# Patient Record
Sex: FEMALE | Race: BLACK OR AFRICAN AMERICAN | NOT HISPANIC OR LATINO | Employment: OTHER | URBAN - METROPOLITAN AREA
[De-identification: names, ages, dates, MRNs, and addresses within clinical notes are randomized per-mention and may not be internally consistent; named-entity substitution may affect disease eponyms.]

---

## 2017-03-16 ENCOUNTER — HOSPITAL ENCOUNTER (OUTPATIENT)
Dept: RADIOLOGY | Facility: OTHER | Age: 72
Discharge: HOME | End: 2017-03-16
Attending: ANESTHESIOLOGY
Payer: MEDICARE

## 2017-03-16 ENCOUNTER — OFFICE VISIT (OUTPATIENT)
Dept: PAIN MEDICINE | Facility: CLINIC | Age: 72
End: 2017-03-16
Attending: ANESTHESIOLOGY
Payer: MEDICARE

## 2017-03-16 VITALS
DIASTOLIC BLOOD PRESSURE: 68 MMHG | TEMPERATURE: 98 F | SYSTOLIC BLOOD PRESSURE: 127 MMHG | BODY MASS INDEX: 29.95 KG/M2 | WEIGHT: 169 LBS | HEART RATE: 68 BPM | HEIGHT: 63 IN

## 2017-03-16 DIAGNOSIS — M25.552 BILATERAL HIP PAIN: ICD-10-CM

## 2017-03-16 DIAGNOSIS — M47.816 FACET ARTHRITIS OF LUMBAR REGION: Primary | ICD-10-CM

## 2017-03-16 DIAGNOSIS — M25.551 BILATERAL HIP PAIN: ICD-10-CM

## 2017-03-16 DIAGNOSIS — M47.816 FACET ARTHRITIS OF LUMBAR REGION: ICD-10-CM

## 2017-03-16 PROCEDURE — 1157F ADVNC CARE PLAN IN RCRD: CPT | Mod: S$GLB,,, | Performed by: ANESTHESIOLOGY

## 2017-03-16 PROCEDURE — 73521 X-RAY EXAM HIPS BI 2 VIEWS: CPT | Mod: TC

## 2017-03-16 PROCEDURE — 1159F MED LIST DOCD IN RCRD: CPT | Mod: S$GLB,,, | Performed by: ANESTHESIOLOGY

## 2017-03-16 PROCEDURE — 99204 OFFICE O/P NEW MOD 45 MIN: CPT | Mod: GC,S$GLB,, | Performed by: ANESTHESIOLOGY

## 2017-03-16 PROCEDURE — 73521 X-RAY EXAM HIPS BI 2 VIEWS: CPT | Mod: 26,,, | Performed by: RADIOLOGY

## 2017-03-16 PROCEDURE — 1160F RVW MEDS BY RX/DR IN RCRD: CPT | Mod: S$GLB,,, | Performed by: ANESTHESIOLOGY

## 2017-03-16 PROCEDURE — 72110 X-RAY EXAM L-2 SPINE 4/>VWS: CPT | Mod: 26,,, | Performed by: RADIOLOGY

## 2017-03-16 PROCEDURE — 99999 PR PBB SHADOW E&M-NEW PATIENT-LVL III: CPT | Mod: PBBFAC,,, | Performed by: ANESTHESIOLOGY

## 2017-03-16 PROCEDURE — 72110 X-RAY EXAM L-2 SPINE 4/>VWS: CPT | Mod: TC

## 2017-03-16 PROCEDURE — 1125F AMNT PAIN NOTED PAIN PRSNT: CPT | Mod: S$GLB,,, | Performed by: ANESTHESIOLOGY

## 2017-03-16 NOTE — PROGRESS NOTES
Subjective:     Patient ID: Diane Reid is a 71 y.o. female.    Chief Complaint: Pain    Consulted by: self, friend of Dinah Walker    Disclaimer: This note was generated using voice recognition software.  There may be a typographical errors that were missed during proofreading.      HPI:    Diane Reid is a 71 y.o. female who presents today with back pain. She has a 20+ year hx of back pain.   She has not seen any physician for her pain and has been treating it successfully with OTC ibuprofen and visits to a chiropractor. This pain is described in detail below.    Physical Therapy: no    Non-pharmacologic Treatment: chiropractor          · TENS? no    Pain Medications:         · Currently taking: NSAIDS    · Has tried in the past:  biofreese     · Has not tried:  Opioids, Tylenol, Muscle relaxants, TCAs, SNRIs, anticonvulsants, prescription topical creams    Blood thinners: no    Interventional Therapies: none    Relevant Surgeries: none    Affecting sleep? no    Affecting daily activities? yes    Depressive symptoms? no          · SI/HI? No    Work status: retired     Low-back Pain   This is a chronic problem. The current episode started more than 1 year ago. The problem occurs constantly. The problem has been gradually worsening since onset. The pain is present in the lumbar spine. The pain does not radiate. The quality of the pain is described as aching (throbbing). The pain is at a severity of 5/10. The pain is mild. The pain is the same all the time. The symptoms are aggravated by sitting and lying down. Stiffness is present all day. Pertinent negatives include no abdominal pain, bladder incontinence, bowel incontinence, chest pain, fever, numbness or paresthesias. She has tried chiropractic manipulation, heat and home exercises for the symptoms. The treatment provided no relief. Physical therapy was not tried.      No flowsheet data found.]    Review of Systems   Constitution: Negative for chills,  fever and night sweats.   Eyes: Negative for photophobia.   Cardiovascular: Negative for chest pain, dyspnea on exertion, irregular heartbeat and palpitations.   Respiratory: Negative for cough and shortness of breath.    Endocrine: Negative for cold intolerance and heat intolerance.   Hematologic/Lymphatic: Negative for bleeding problem. Does not bruise/bleed easily.   Musculoskeletal: Positive for back pain. Negative for neck pain.   Gastrointestinal: Negative for abdominal pain, change in bowel habit, bowel incontinence, constipation and diarrhea.   Genitourinary: Negative for bladder incontinence, frequency and incomplete emptying.   Neurological: Negative for dizziness, numbness and paresthesias.   Psychiatric/Behavioral: Negative for depression, suicidal ideas and thoughts of violence.   All other systems reviewed and are negative.            Past Medical History:   Diagnosis Date    Arthritis     Asthma     Pinched nerve in neck     bilateral    Pulmonary stenosis        Past Surgical History:   Procedure Laterality Date    CERVICAL SPINE SURGERY      HYSTERECTOMY         Review of patient's allergies indicates:   Allergen Reactions    Penicillins      Pt cant remember allergy type         Current Outpatient Prescriptions   Medication Sig Dispense Refill    estrogens, conjugated, (PREMARIN) 1.25 MG tablet Take 1.25 mg by mouth once daily.      oxycodone-acetaminophen 5-325 mg (PERCOCET) 5-325 mg per tablet Take 1 tablet by mouth every 4 (four) hours as needed for Pain. 45 tablet 0     No current facility-administered medications for this visit.        History reviewed. No pertinent family history.    Social History     Social History    Marital status: Single     Spouse name: N/A    Number of children: N/A    Years of education: N/A     Occupational History    Not on file.     Social History Main Topics    Smoking status: Former Smoker     Years: 20.00     Quit date: 1/1/2006    Smokeless  "tobacco: Not on file    Alcohol use Yes      Comment: occasional    Drug use: No    Sexual activity: Not on file     Other Topics Concern    Not on file     Social History Narrative       Objective:     Vitals:    03/16/17 0934   Temp: 98 °F (36.7 °C)   TempSrc: Oral   Weight: 76.7 kg (169 lb)   Height: 5' 3" (1.6 m)   PainSc:   4   PainLoc: Back       GEN:  Well developed, well nourished.  No acute distress.   HEENT:  No trauma.  Mucous membranes moist.  Nares patent bilaterally.  PSYCH: Normal affect. Thought content appropriate.  CHEST:  Breathing symmetric.  No audible wheezing.  ABD: Soft, non-tender, non-distended.  SKIN:  Warm, pink, dry.  No rash on exposed areas.    EXT:  No cyanosis, clubbing, or edema.  No color change or changes in nail or hair growth.  NEURO/MUSCULOSKELETAL:  Fully alert, oriented, and appropriate. Speech normal willy. No cranial nerve deficits.   Gait: antalgic.  - trendelenburg sign bilaterally.   Motor Strength: 5/5 motor strength throughout lower extremities.   Sensory: - sensory deficit in the lower extremities.   Reflexes:  2+ and symmetric throughout.  Downgoing Babinski's bilaterally.  No clonus or spasticity.  L-Spine:  normal ROM with pain on extension. + facet loading bilaterally.  - SLR bilaterally.  - femoral stretch bilaterally.  SI Joint/Hip: + KEYSHA bilaterally.  + FADIR bilaterally.  no TTP over lumbar paraspinals, bilateral SI joints, hips, piriformis muscles, or GTB.          Imaging:        The imaging studies listed below were independently reviewed by me, and I agree with the findings as documented below.   X RAY Lumbar spine 3/16/17   Narrative   Procedure: Lumbar spine series.  This is interpreted without the benefit of similar prior studies for comparison.    Findings: AP, lateral, obliqueand coned down lateral radiographs of the lumbar spine reveal 5 non-rib bearing lumbar vertebral bodies with normal vertebral body heights and pedicles.  Loss of disc " height at L3-L4 and L5-S1.  There is no malalignment, spondylolysis or spondylolisthesis.    Facet arthropathy is identified throughout the spine.  This is most pronounced at L3-L4 and L5-S1.  The surrounding soft tissues are normal.   Impression    Multilevel degenerative disc and joint disease of the lumbar spine most advanced at L3-L4 and L5-S1.        B hip xray 3/16/17  Comparison: None.    Findings: AP and frog lateral radiographs of the hips and pelvis demonstrate moderate axial joint space loss on the left and mild axial joint space loss on the right.  There is normal mineralization.  No fracture, dislocation or osseous destructive process.  Subchondral cysts are identified in the femoral head on the right and in the left superior acetabulum.  The surrounding soft tissues and remaining joint spaces are normal.   Impression    Mild to moderate osteoporosis of both hips with no acute findings.           Assessment:     Encounter Diagnoses   Name Primary?    Facet arthritis of lumbar region Yes    Bilateral hip pain        Plan:     Diane DUNN was seen today for low-back pain.    Diagnoses and all orders for this visit:    Facet arthritis of lumbar region  -     X-Ray Lumbar Spine Complete 5 View; Future    Bilateral hip pain  -     X-Ray Hips Bilateral 2 View Incl AP Pelvis; Future       Ms Tureaud pain is consistent with the above.    We discussed the assessment and recommendations.  All available images were reviewed . We discussed the disease process, prognosis, treatment plan, and risks and benefits. The patient is aware of the risks and benefits of the medications being prescribed, common side effects, and proper usage. The following is the plan we agreed on:     1. X ray of lumbar spine (5 view) and bilateral hips   2. B lumbar facet joint injections L4-5 and L5-S1  1. If good relief, repeat PRN  2. If good, but not durable relief, MBB workup   3. If no relief, MRI   3. Continue ibuprofen PRN and home  exercise program   4. RTC in 3-6 weeks or sooner if needed.    Donal Theodore MD   Anesthesiology PGY IV  [phone]    Thank you for allowing me to participate in the care of this patient.   Please do not hesitate to call me at [phone] with any questions or concerns.    Greater than 25 minutes spent in total in todays visit with the patient, with more than half that time direct face to face counseling and education with the patient today. We discussed the disease process, prognosis, treatment plan, and risks and benefits.    I have seen the patient with the resident physician.  I have performed my own history and physical exam and we have come up with the above plan.  The patient is in agreement with our plan.       The above plan and management options were discussed at length with patient. Patient is in agreement with the above and verbalized understanding. It will be communicated with the referring physician via electronic record, fax, or mail.

## 2017-03-28 ENCOUNTER — HOSPITAL ENCOUNTER (OUTPATIENT)
Facility: OTHER | Age: 72
Discharge: HOME | End: 2017-03-28
Attending: ANESTHESIOLOGY | Admitting: ANESTHESIOLOGY
Payer: MEDICARE

## 2017-03-28 ENCOUNTER — SURGERY (OUTPATIENT)
Age: 72
End: 2017-03-28

## 2017-03-28 VITALS
OXYGEN SATURATION: 99 % | RESPIRATION RATE: 18 BRPM | SYSTOLIC BLOOD PRESSURE: 151 MMHG | HEIGHT: 63 IN | DIASTOLIC BLOOD PRESSURE: 90 MMHG | TEMPERATURE: 99 F | BODY MASS INDEX: 29.59 KG/M2 | HEART RATE: 60 BPM | WEIGHT: 167 LBS

## 2017-03-28 DIAGNOSIS — M47.816 FACET ARTHRITIS OF LUMBAR REGION: Primary | ICD-10-CM

## 2017-03-28 PROCEDURE — 64493 INJ PARAVERT F JNT L/S 1 LEV: CPT | Mod: 50 | Performed by: ANESTHESIOLOGY

## 2017-03-28 PROCEDURE — 25000003 PHARM REV CODE 250: Performed by: ANESTHESIOLOGY

## 2017-03-28 PROCEDURE — 25500020 PHARM REV CODE 255: Performed by: ANESTHESIOLOGY

## 2017-03-28 PROCEDURE — 64494 INJ PARAVERT F JNT L/S 2 LEV: CPT | Mod: 50 | Performed by: ANESTHESIOLOGY

## 2017-03-28 PROCEDURE — 63600175 PHARM REV CODE 636 W HCPCS: Performed by: ANESTHESIOLOGY

## 2017-03-28 PROCEDURE — 64493 INJ PARAVERT F JNT L/S 1 LEV: CPT | Mod: 50,,, | Performed by: ANESTHESIOLOGY

## 2017-03-28 PROCEDURE — 64494 INJ PARAVERT F JNT L/S 2 LEV: CPT | Mod: 50,,, | Performed by: ANESTHESIOLOGY

## 2017-03-28 RX ORDER — METHYLPREDNISOLONE ACETATE 80 MG/ML
80 INJECTION, SUSPENSION INTRA-ARTICULAR; INTRALESIONAL; INTRAMUSCULAR; SOFT TISSUE ONCE
Status: COMPLETED | OUTPATIENT
Start: 2017-03-29 | End: 2017-03-28

## 2017-03-28 RX ORDER — LIDOCAINE HYDROCHLORIDE 10 MG/ML
10 INJECTION INFILTRATION; PERINEURAL ONCE
Status: COMPLETED | OUTPATIENT
Start: 2017-03-29 | End: 2017-03-28

## 2017-03-28 RX ORDER — BUPIVACAINE HYDROCHLORIDE 5 MG/ML
3 INJECTION, SOLUTION EPIDURAL; INTRACAUDAL ONCE
Status: COMPLETED | OUTPATIENT
Start: 2017-03-29 | End: 2017-03-28

## 2017-03-28 RX ADMIN — IOHEXOL 5 ML: 300 INJECTION, SOLUTION INTRAVENOUS at 10:03

## 2017-03-28 RX ADMIN — SODIUM BICARBONATE 4 MEQ: 0.2 INJECTION, SOLUTION INTRAVENOUS at 10:03

## 2017-03-28 RX ADMIN — LIDOCAINE HYDROCHLORIDE 10 ML: 10 INJECTION, SOLUTION INFILTRATION; PERINEURAL at 10:03

## 2017-03-28 RX ADMIN — BUPIVACAINE HYDROCHLORIDE 10 ML: 5 INJECTION, SOLUTION EPIDURAL; INTRACAUDAL; PERINEURAL at 10:03

## 2017-03-28 RX ADMIN — METHYLPREDNISOLONE ACETATE 80 MG: 80 INJECTION, SUSPENSION INTRA-ARTICULAR; INTRALESIONAL; INTRAMUSCULAR; SOFT TISSUE at 10:03

## 2017-03-28 NOTE — OP NOTE
"Date of procedure: 03/28/2017    Procedure: Bilateral L4-5 and L5-S1 Facet joint injection     Pre-op diagnosis: Lumbar facet arthritis     Post-op diagnosis: Same     Physician: Dr. Suellen Castro     Assistant: Dr. Tejeda    Anesthestia: local    EBL: None    Specimens: None    All medications, allergies, and relevant histories were reviewed. No recent antibiotics or infections. A time-out was taken to verify the correct patient, procedure, laterality, and appropriate medications/allergies.     Fluoroscopically-Guided, Contrast-Controlled Lumbar Facet Joint Injection:     Following denial of allergy and review of potential side effects and complications including but not necessarily limited to infection, allergic reaction, local tissue breakdown, nerve injury, paresis, paralysis, spinal cord injury, and seizure, the patient indicated they understood and agreed to proceed.     Patient was placed in prone position. Lumbar area was prepped and draped in sterile manner. The level was determined under fluoroscopic guidance. Using a 25 gauge 1.5" needle, bicarbonated Xylocaine 1% was given subcutaneously at the level L4-5 on the right. The 3.5in 22-gauge needle was introduced into the right L4-5 facet joint. Following negative aspiration for blood and CSF and confirming the absence of paresthesias, injection of approximately 1 cc of Omnipaque 300 demonstrated intra-articular spread without vascular or intrathecal uptake. At this point, 1cc of a mixture of 3 cc of 0.25% marcaine with 80 mg of Depo-Medrol was injected without complication. The same procedure was performed in an identical fashion on the right L5-S1, left L4-5, and left L5-S1 sequentially.     Patient tolerated the procedure well without any side effects. No noted complications.     The patient was followed post procedure and discharged under their own power in excellent condition.     Future Management:   If helpful, can repeat as needed.   Follow up in 6 " weeks or as needed.    I certify that I provided the above services.  I was present for the entire procedure, which was performed by myself with the assistance of the resident physician.  There were no parts of the procedure that were performed not by myself or without my direct supervision.

## 2017-03-28 NOTE — IP AVS SNAPSHOT
[facility] Location ([facility])  [address] [place], [place] [address]  Phone: [phone]           Patient Discharge Instructions   Our goal is to set you up for success. This packet includes information on your condition, medications, and your home care.  It will help you care for yourself to prevent having to return to the hospital.     Please ask your nurse if you have any questions.      There are many details to remember when preparing to leave the hospital. Here is what you will need to do:    1. Take your medicine. If you are prescribed medications, review your Medication List on the following pages. You may have new medications to  at the pharmacy and others that you'll need to stop taking. Review the instructions for how and when to take your medications. Talk with your doctor or nurses if you are unsure of what to do.     2. Go to your follow-up appointments. Specific follow-up information is listed in the following pages. Your may be contacted by a nurse or clinical provider about future appointments. Be sure we have all of the phone numbers to reach you. Please contact your provider's office if you are unable to make an appointment.     3. Watch for warning signs. Your doctor or nurse will give you detailed warning signs to watch for and when to call for assistance. These instructions may also include educational information about your condition. If you experience any of warning signs to your health, call your doctor.               [facility]  Unless otherwise directed by your provider, please contact [facility], our nurse care line that is available for 24/7 assistance.     [phone] (toll-free)    Registered nurses in the [facility] provide clinical advisement, health education, appointment booking, and other advisory services.                    ** Verify the list of medication(s) below is accurate and up to date. Carry this with you in case of emergency.  If your medications have changed, please notify your healthcare provider.             Medication List      ASK your doctor about these medications        Additional Info                      estrogens (conjugated) 1.25 MG tablet   Commonly known as:  PREMARIN   Refills:  0   Dose:  1.25 mg    Instructions:  Take 1.25 mg by mouth once daily.     Begin Date    AM    Noon    PM    Bedtime       oxycodone-acetaminophen 5-325 mg per tablet   Commonly known as:  PERCOCET   Quantity:  45 tablet   Refills:  0   Dose:  1 tablet    Instructions:  Take 1 tablet by mouth every 4 (four) hours as needed for Pain.     Begin Date    AM    Noon    PM    Bedtime                  Please bring to all follow up appointments:    1. A copy of your discharge instructions.  2. All medicines you are currently taking in their original bottles.  3. Identification and insurance card.    Please arrive 15 minutes ahead of scheduled appointment time.    Please call 24 hours in advance if you must reschedule your appointment and/or time.        Your Scheduled Appointments     Apr 19, 2017  9:00 AM CDT   Established Patient Visit with Suellen Castro MD   [facility]    [address]  [place] [address] [phone]              Follow-up Information     Call Suellen Castro MD.    Specialty:  Pain Medicine    Why:  As needed, If symptoms worsen    Contact information:    [address]  [address]  [facility] [address] [phone]          Follow up with Katt Che MD. Schedule an appointment as soon as possible for a visit in 1 week.    Specialty:  Internal Medicine    Why:  Dr. Castro wants you to follow up because x-ray showed osteoporosis    Contact information:    [address]  [address]  [facility] [address] [phone]            Discharge Instructions       Home Care Instructions Pain Management:    1. DIET:   You may resume your normal diet today.   2. BATHING:   You may shower with  "luke warm water.  3. DRESSING:   You may remove your bandage today.   4. ACTIVITY LEVEL:   You may resume your normal activities 24 hrs after your procedure.  5. MEDICATIONS:   You may resume your normal medications today.   6. SPECIAL INSTRUCTIONS:   No heat to the injection site for 24 hrs including, bath or shower, heating pad, moist heat, or hot tubs.    Use ice pack to injection site for any pain or discomfort.  Apply ice packs for 20 minute intervals as needed.   If you have received any sedatives by mouth today you may not drive for 12 hours.    If you have received any sedation through your IV, you may not drive for 24 hrs.     PLEASE CALL YOUR DOCTOR IF:  1. Redness or swelling around the injection site.  2. Fever of 101 degrees  3. Drainage (pus) from the injection site.  4. For any continuous bleeding (some dried blood over the incision is normal.)    FOR EMERGENCIES:   If any unusual problems or difficulties occur during clinic hours, call [phone] or [phone].         Admission Information     Date & Time Provider Department [facility]    3/28/2017  9:46 AM Suellen Castro MD [facility] [phone]      Care Providers     Provider Role Specialty Primary office phone    Suellen Castro MD Attending Provider Pain Medicine [phone]    Suellen Castro MD Surgeon  Pain Medicine [phone]      Your Vitals Were     BP Pulse Temp Resp Height Weight    151/90 60 98.7 °F (37.1 °C) (Oral) 18 5' 3" (1.6 m) 75.8 kg (167 lb)    SpO2 BMI             99% 29.58 kg/m2         Recent Lab Values     No lab values to display.      Allergies as of 3/28/2017        Reactions    Penicillins     Pt cant remember allergy type      Advance Directives     An advance directive is a document which, in the event you are no longer able to make decisions for yourself, tells your healthcare team what kind of treatment you do or do not want to receive, or who you would like to make those decisions for you.  If you do " not currently have an advance directive, [facility] encourages you to create one.  For more information call:  [phone]), [phone] ([phone]),  or log on to [email or web address].        Smoking Cessation     If you would like to quit smoking:   You may be eligible for free services if you are a [place] resident and started smoking cigarettes before September 1, 1988.  Call the Smoking Cessation Trust (SCT) toll free at (757) 967-9418 or (910) 969-4750.   Call 1-675-QUIT-NOW if you do not meet the above criteria.            Language Assistance Services     ATTENTION: Language assistance services are available, free of charge. Please call [phone].      ATENCIÓN: Si habla español, tiene a valente disposición servicios gratuitos de asistencia lingüística. Llame al [phone].     CHÚ Ý: N?u b?n nói Ti?ng Vi?t, có các d?ch v? h? tr? ngôn ng? mi?n phí dành cho b?n. G?i s? [phone].        [facility] Sign-Up     Activating your [facility] account is as easy as 1-2-3!     1) Visit [email or web address], select Sign Up Now, enter this activation code and your date of birth, then select Next.  [email or web address]  Expires: 5/12/2017 10:56 AM      2) Create a username and password to use when you visit [facility] in the future and select a security question in case you lose your password and select Next.    3) Enter your e-mail address and click Sign Up!    Additional Information  If you have questions, please e-mail [email or web address] or call [phone] to talk to our [facility] staff. Remember, [facility] is NOT to be used for urgent needs. For medical emergencies, dial 911.          [facility] complies with applicable Federal civil rights laws and does not discriminate on the basis of race, color, national origin, age, disability, or sex.

## 2017-03-28 NOTE — DISCHARGE INSTRUCTIONS

## 2017-04-20 ENCOUNTER — OFFICE VISIT (OUTPATIENT)
Dept: PAIN MEDICINE | Facility: CLINIC | Age: 72
End: 2017-04-20
Attending: ANESTHESIOLOGY
Payer: MEDICARE

## 2017-04-20 VITALS
BODY MASS INDEX: 30.09 KG/M2 | HEIGHT: 63 IN | WEIGHT: 169.81 LBS | HEART RATE: 69 BPM | SYSTOLIC BLOOD PRESSURE: 125 MMHG | RESPIRATION RATE: 20 BRPM | TEMPERATURE: 98 F | DIASTOLIC BLOOD PRESSURE: 73 MMHG

## 2017-04-20 DIAGNOSIS — M47.816 FACET ARTHRITIS OF LUMBAR REGION: Primary | ICD-10-CM

## 2017-04-20 PROCEDURE — 99213 OFFICE O/P EST LOW 20 MIN: CPT | Mod: S$GLB,,, | Performed by: ANESTHESIOLOGY

## 2017-04-20 PROCEDURE — 1160F RVW MEDS BY RX/DR IN RCRD: CPT | Mod: S$GLB,,, | Performed by: ANESTHESIOLOGY

## 2017-04-20 PROCEDURE — 1159F MED LIST DOCD IN RCRD: CPT | Mod: S$GLB,,, | Performed by: ANESTHESIOLOGY

## 2017-04-20 PROCEDURE — 1125F AMNT PAIN NOTED PAIN PRSNT: CPT | Mod: S$GLB,,, | Performed by: ANESTHESIOLOGY

## 2017-04-20 PROCEDURE — 99999 PR PBB SHADOW E&M-EST. PATIENT-LVL III: CPT | Mod: PBBFAC,,, | Performed by: ANESTHESIOLOGY

## 2017-04-20 RX ORDER — TRAVOPROST 0.04 MG/ML
1 SOLUTION/ DROPS OPHTHALMIC NIGHTLY
Refills: 0 | COMMUNITY
Start: 2017-04-07

## 2017-04-20 RX ORDER — BRIMONIDINE TARTRATE, TIMOLOL MALEATE 2; 5 MG/ML; MG/ML
1 SOLUTION/ DROPS OPHTHALMIC 2 TIMES DAILY
Refills: 3 | COMMUNITY
Start: 2017-01-11

## 2017-04-20 RX ORDER — DUREZOL 0.5 MG/ML
EMULSION OPHTHALMIC
Refills: 0 | COMMUNITY
Start: 2017-02-14

## 2017-04-20 RX ORDER — TIMOLOL MALEATE 5 MG/ML
SOLUTION/ DROPS OPHTHALMIC
COMMUNITY
Start: 2017-04-18

## 2017-04-20 NOTE — MR AVS SNAPSHOT
[facility]  [address]  [place] [address]  Phone: [phone]  Fax: [phone]                  Diane Reid   2017 2:00 PM   Office Visit    Description:  Female : 1945   Provider:  Suellen Castro MD   Department:  [facility]           Reason for Visit     Low-back Pain           Diagnoses this Visit        Comments    Facet arthritis of lumbar region    -  Primary            To Do List           Goals (5 Years of Data)     None      [facility]     [facility] Care [facility] -  Assistance  Unless otherwise directed by your provider, please contact [facility], our nurse care line that is available for  assistance.     Registered nurses in the [facility] provide: appointment scheduling, clinical advisement, health education, and other advisory services.  Call: [phone] (toll free)               Medications           Message regarding Medications     Verify the changes and/or additions to your medication regime listed below are the same as discussed with your clinician today.  If any of these changes or additions are incorrect, please notify your healthcare provider.             Verify that the below list of medications is an accurate representation of the medications you are currently taking.  If none reported, the list may be blank. If incorrect, please contact your healthcare provider. Carry this list with you in case of emergency.           Current Medications     COMBIGAN 0.2-0.5 % Drop Place 1 drop into both eyes 2 (two) times daily.    DUREZOL 0.05 % Drop ophthalmic solution 1 DROP IN LEFT EYE IN THE MORNING    estrogens, conjugated, (PREMARIN) 1.25 MG tablet Take 1.25 mg by mouth once daily.    oxycodone-acetaminophen 5-325 mg (PERCOCET) 5-325 mg per tablet Take 1 tablet by mouth every 4 (four) hours as needed for Pain.    timolol maleate 0.5% (TIMOPTIC) 0.5 % Drop     TRAVATAN Z 0.004 % Drop Place 1  "drop into both eyes every evening.           Clinical Reference Information           Your Vitals Were     BP Pulse Temp Resp Height Weight    125/73 69 97.8 °F (36.6 °C) (Oral) 20 5' 3" (1.6 m) 77 kg (169 lb 12.8 oz)    BMI                30.08 kg/m2          Blood Pressure          Most Recent Value    BP  125/73      Allergies as of 4/20/2017     Penicillins      Immunizations Administered on Date of Encounter - 4/20/2017     None      [facility] Sign-Up     Activating your [facility] account is as easy as 1-2-3!     1) Visit [email or web address], select Sign Up Now, enter this activation code and your date of birth, then select Next.  [email or web address]  Expires: 5/12/2017 10:56 AM      2) Create a username and password to use when you visit [facility] in the future and select a security question in case you lose your password and select Next.    3) Enter your e-mail address and click Sign Up!    Additional Information  If you have questions, please e-mail [email or web address] or call [phone] to talk to our [facility] staff. Remember, [facility] is NOT to be used for urgent needs. For medical emergencies, dial 911.         Instructions      Medial Branch Neurotomy  Back or neck pain may be due to problems with certain nerves near your spine. If so, a medial branch neurotomy can help relieve your pain.  In some cases, your doctor may give you a nerve block to predict your response to neurotomy. The treatment uses heat, cold, radiofrequency, or chemicals to destroy the nerves near a problem joint. This keeps some pain messages from traveling to your brain, and helps relieve your symptoms.    Medial branch nerves  Each vertebra in your spine has facets (flat surfaces). They touch where the vertebrae fit together. This forms a facet joint. Each facet joint has at least two medial branch nerves. They are part of the nerve pathway to and from each facet joint. A facet joint in your back or neck can become inflamed (swollen " and irritated). Pain messages may then travel along the nerve pathway from the facet joint to your brain.    Blocking pain messages  Medial branch nerves in each facet joint send and carry messages about back or neck pain. Destroying a few of these nerves can keep certain pain messages from reaching your brain. This can help bring you relief. The relief typically lasts for months to years.  Risks and complications  Risks and complications are rare, but can include:  · Infection  · Increased pain, numbness, or weakness  · Nerve damage  · Bleeding  · Failure to relieve pain   Date Last Reviewed: 10/11/2015  © 0492-9940 [employer]. [address] [place] [address]. All rights reserved. This information is not intended as a substitute for professional medical care. Always follow your healthcare professional's instructions.             Language Assistance Services     ATTENTION: Language assistance services are available, free of charge. Please call [phone].      ATENCIÓN: Si habla español, tiene a valente disposición servicios gratuitos de asistencia lingüística. Llame al [phone].     CHÚ Ý: N?u b?n nói Ti?ng Vi?t, có các d?ch v? h? tr? ngôn ng? mi?n phí dành cho b?n. G?i s? [phone].         [facility] complies with applicable Federal civil rights laws and does not discriminate on the basis of race, color, national origin, age, disability, or sex.

## 2017-04-20 NOTE — PROGRESS NOTES
Subjective:     Patient ID: Diane Reid is a 71 y.o. female.    Chief Complaint: Pain    Consulted by: self, friend of Dinah Walker    Disclaimer: This note was generated using voice recognition software.  There may be a typographical errors that were missed during proofreading.      HPI:    Diane Reid is a 71 y.o. female who presents today with back pain. She has a 20+ year hx of back pain.   She has not seen any physician for her pain and has been treating it successfully with OTC ibuprofen and visits to a chiropractor. This pain is described in detail below.    Interval History (4/20/2017):  She returns today for follow up.  She reports that she got 100% relief for 3-4 days from the facet joint injections.  The pain is now returning, but it is not at baseline.  She is satisfied with her current pain control.    Physical Therapy: no    Non-pharmacologic Treatment: chiropractor          · TENS? no    Pain Medications:         · Currently taking: NSAIDS    · Has tried in the past:  biofreeze     · Has not tried:  Opioids, Tylenol, Muscle relaxants, TCAs, SNRIs, anticonvulsants, prescription topical creams    Blood thinners: no    Interventional Therapies: none    Relevant Surgeries: none    Affecting sleep? no    Affecting daily activities? yes    Depressive symptoms? no          · SI/HI? No    Work status: retired     Pain Scales  Best: 3/10  Worst: 10/10  Usually: 5/10  Today: 3/10    Low-back Pain   This is a chronic problem. The current episode started more than 1 year ago. The problem occurs constantly. The problem has been gradually worsening since onset. The pain is present in the lumbar spine. The pain does not radiate. The quality of the pain is described as aching (throbbing). The pain is at a severity of 5/10. The pain is mild. The pain is the same all the time. The symptoms are aggravated by sitting and lying down. Stiffness is present all day. Pertinent negatives include no abdominal pain,  bladder incontinence, bowel incontinence, chest pain, fever, numbness or paresthesias. She has tried chiropractic manipulation, heat and home exercises for the symptoms. The treatment provided no relief. Physical therapy was not tried.      Last 3 PDI Scores 4/20/2017   Pain Disability Index (PDI) 56       Review of Systems   Constitution: Negative for chills, fever and night sweats.   Eyes: Negative for photophobia.   Cardiovascular: Negative for chest pain, dyspnea on exertion, irregular heartbeat and palpitations.   Respiratory: Negative for cough and shortness of breath.    Endocrine: Negative for cold intolerance and heat intolerance.   Hematologic/Lymphatic: Negative for bleeding problem. Does not bruise/bleed easily.   Musculoskeletal: Positive for back pain. Negative for neck pain.   Gastrointestinal: Negative for abdominal pain, change in bowel habit, bowel incontinence, constipation and diarrhea.   Genitourinary: Negative for bladder incontinence, frequency and incomplete emptying.   Neurological: Negative for dizziness, numbness and paresthesias.   Psychiatric/Behavioral: Negative for depression, suicidal ideas and thoughts of violence.   All other systems reviewed and are negative.            Past Medical History:   Diagnosis Date    Arthritis     Asthma     Pinched nerve in neck     bilateral    Pulmonary stenosis        Past Surgical History:   Procedure Laterality Date    CERVICAL SPINE SURGERY      HYSTERECTOMY         Review of patient's allergies indicates:   Allergen Reactions    Penicillins      Pt cant remember allergy type         Current Outpatient Prescriptions   Medication Sig Dispense Refill    COMBIGAN 0.2-0.5 % Drop Place 1 drop into both eyes 2 (two) times daily.  3    DUREZOL 0.05 % Drop ophthalmic solution 1 DROP IN LEFT EYE IN THE MORNING  0    estrogens, conjugated, (PREMARIN) 1.25 MG tablet Take 1.25 mg by mouth once daily.      oxycodone-acetaminophen 5-325 mg (PERCOCET)  "5-325 mg per tablet Take 1 tablet by mouth every 4 (four) hours as needed for Pain. 45 tablet 0    timolol maleate 0.5% (TIMOPTIC) 0.5 % Drop       TRAVATAN Z 0.004 % Drop Place 1 drop into both eyes every evening.  0     No current facility-administered medications for this visit.        History reviewed. No pertinent family history.    Social History     Social History    Marital status: Single     Spouse name: N/A    Number of children: N/A    Years of education: N/A     Occupational History    Not on file.     Social History Main Topics    Smoking status: Former Smoker     Years: 20.00     Quit date: 1/1/2006    Smokeless tobacco: Not on file    Alcohol use Yes      Comment: occasional    Drug use: No    Sexual activity: Not on file     Other Topics Concern    Not on file     Social History Narrative       Objective:     Vitals:    04/20/17 1400   BP: 125/73   Pulse: 69   Resp: 20   Temp: 97.8 °F (36.6 °C)   TempSrc: Oral   Weight: 77 kg (169 lb 12.8 oz)   Height: 5' 3" (1.6 m)   PainSc:   3   PainLoc: Back       GEN:  Well developed, well nourished.  No acute distress.   HEENT:  No trauma.  Mucous membranes moist.  Nares patent bilaterally.  PSYCH: Normal affect. Thought content appropriate.  CHEST:  Breathing symmetric.  No audible wheezing.  ABD: Soft, non-tender, non-distended.  SKIN:  Warm, pink, dry.  No rash on exposed areas.    EXT:  No cyanosis, clubbing, or edema.  No color change or changes in nail or hair growth.  NEURO/MUSCULOSKELETAL:  Fully alert, oriented, and appropriate. Speech normal willy. No cranial nerve deficits.   Gait: Normal.    Previous physical exam:    - trendelenburg sign bilaterally.   Motor Strength: 5/5 motor strength throughout lower extremities.   Sensory: - sensory deficit in the lower extremities.   Reflexes:  2+ and symmetric throughout.  Downgoing Babinski's bilaterally.  No clonus or spasticity.  L-Spine:  normal ROM with pain on extension. + facet loading " bilaterally.  - SLR bilaterally.  - femoral stretch bilaterally.  SI Joint/Hip: + KEYSHA bilaterally.  + FADIR bilaterally.  no TTP over lumbar paraspinals, bilateral SI joints, hips, piriformis muscles, or GTB.          Imaging:        The imaging studies listed below were independently reviewed by me, and I agree with the findings as documented below.   X RAY Lumbar spine 3/16/17   Narrative   Procedure: Lumbar spine series.  This is interpreted without the benefit of similar prior studies for comparison.    Findings: AP, lateral, obliqueand coned down lateral radiographs of the lumbar spine reveal 5 non-rib bearing lumbar vertebral bodies with normal vertebral body heights and pedicles.  Loss of disc height at L3-L4 and L5-S1.  There is no malalignment, spondylolysis or spondylolisthesis.    Facet arthropathy is identified throughout the spine.  This is most pronounced at L3-L4 and L5-S1.  The surrounding soft tissues are normal.   Impression    Multilevel degenerative disc and joint disease of the lumbar spine most advanced at L3-L4 and L5-S1.        B hip xray 3/16/17  Comparison: None.    Findings: AP and frog lateral radiographs of the hips and pelvis demonstrate moderate axial joint space loss on the left and mild axial joint space loss on the right.  There is normal mineralization.  No fracture, dislocation or osseous destructive process.  Subchondral cysts are identified in the femoral head on the right and in the left superior acetabulum.  The surrounding soft tissues and remaining joint spaces are normal.   Impression    Mild to moderate osteoporosis of both hips with no acute findings.           Assessment:     Encounter Diagnosis   Name Primary?    Facet arthritis of lumbar region Yes       Plan:     Diane was seen today for low-back pain.    Diagnoses and all orders for this visit:    Facet arthritis of lumbar region       Ms Tureaud pain is consistent with the above.    We discussed the assessment  and recommendations.  All available images were reviewed . We discussed the disease process, prognosis, treatment plan, and risks and benefits. The patient is aware of the risks and benefits of the medications being prescribed, common side effects, and proper usage. The following is the plan we agreed on:     1. Offered L3-5 MBB with plans to proceed wit RFA.  She would like to hold off on this for now.  2. Continue ibuprofen PRN and home exercise program   3. RTC as needed.    The above plan and management options were discussed at length with patient. Patient is in agreement with the above and verbalized understanding. It will be communicated with the referring physician via electronic record, fax, or mail.    Ortho/SPM Exam

## 2017-04-20 NOTE — PATIENT INSTRUCTIONS
Medial Branch Neurotomy  Back or neck pain may be due to problems with certain nerves near your spine. If so, a medial branch neurotomy can help relieve your pain.  In some cases, your doctor may give you a nerve block to predict your response to neurotomy. The treatment uses heat, cold, radiofrequency, or chemicals to destroy the nerves near a problem joint. This keeps some pain messages from traveling to your brain, and helps relieve your symptoms.    Medial branch nerves  Each vertebra in your spine has facets (flat surfaces). They touch where the vertebrae fit together. This forms a facet joint. Each facet joint has at least two medial branch nerves. They are part of the nerve pathway to and from each facet joint. A facet joint in your back or neck can become inflamed (swollen and irritated). Pain messages may then travel along the nerve pathway from the facet joint to your brain.    Blocking pain messages  Medial branch nerves in each facet joint send and carry messages about back or neck pain. Destroying a few of these nerves can keep certain pain messages from reaching your brain. This can help bring you relief. The relief typically lasts for months to years.  Risks and complications  Risks and complications are rare, but can include:  · Infection  · Increased pain, numbness, or weakness  · Nerve damage  · Bleeding  · Failure to relieve pain   Date Last Reviewed: 10/11/2015  © 2701-8335 [employer]. [address] [place] [address], [place] [address]. All rights reserved. This information is not intended as a substitute for professional medical care. Always follow your healthcare professional's instructions.

## 2021-01-15 ENCOUNTER — IMMUNIZATION (OUTPATIENT)
Dept: INTERNAL MEDICINE | Facility: CLINIC | Age: 76
End: 2021-01-15
Payer: MEDICARE

## 2021-01-15 DIAGNOSIS — Z23 NEED FOR VACCINATION: Primary | ICD-10-CM

## 2021-01-15 PROCEDURE — 91300 COVID-19, MRNA, LNP-S, PF, 30 MCG/0.3 ML DOSE VACCINE: CPT | Mod: PBBFAC | Performed by: INTERNAL MEDICINE

## 2021-02-05 ENCOUNTER — IMMUNIZATION (OUTPATIENT)
Dept: INTERNAL MEDICINE | Facility: CLINIC | Age: 76
End: 2021-02-05
Payer: MEDICARE

## 2021-02-05 DIAGNOSIS — Z23 NEED FOR VACCINATION: Primary | ICD-10-CM

## 2021-02-05 PROCEDURE — 91300 COVID-19, MRNA, LNP-S, PF, 30 MCG/0.3 ML DOSE VACCINE: CPT | Mod: PBBFAC | Performed by: INTERNAL MEDICINE

## 2021-02-05 PROCEDURE — 0002A COVID-19, MRNA, LNP-S, PF, 30 MCG/0.3 ML DOSE VACCINE: CPT | Mod: PBBFAC | Performed by: INTERNAL MEDICINE

## 2021-09-21 ENCOUNTER — TELEPHONE (OUTPATIENT)
Dept: ORTHOPEDICS | Facility: CLINIC | Age: 76
End: 2021-09-21

## 2021-09-21 DIAGNOSIS — M25.561 RIGHT KNEE PAIN, UNSPECIFIED CHRONICITY: Primary | ICD-10-CM

## 2021-09-23 ENCOUNTER — OFFICE VISIT (OUTPATIENT)
Dept: ORTHOPEDICS | Facility: CLINIC | Age: 76
End: 2021-09-23
Payer: MEDICARE

## 2021-09-23 ENCOUNTER — HOSPITAL ENCOUNTER (OUTPATIENT)
Dept: RADIOLOGY | Facility: HOSPITAL | Age: 76
Discharge: HOME | End: 2021-09-23
Attending: ORTHOPAEDIC SURGERY
Payer: MEDICARE

## 2021-09-23 VITALS
SYSTOLIC BLOOD PRESSURE: 145 MMHG | DIASTOLIC BLOOD PRESSURE: 81 MMHG | HEART RATE: 75 BPM | BODY MASS INDEX: 32.3 KG/M2 | HEIGHT: 63 IN | WEIGHT: 182.31 LBS

## 2021-09-23 DIAGNOSIS — M25.561 RIGHT KNEE PAIN, UNSPECIFIED CHRONICITY: ICD-10-CM

## 2021-09-23 DIAGNOSIS — M25.561 ACUTE PAIN OF RIGHT KNEE: Primary | ICD-10-CM

## 2021-09-23 DIAGNOSIS — M62.81 QUADRICEPS WEAKNESS: ICD-10-CM

## 2021-09-23 PROCEDURE — 1159F PR MEDICATION LIST DOCUMENTED IN MEDICAL RECORD: ICD-10-PCS | Mod: CPTII,S$GLB,, | Performed by: ORTHOPAEDIC SURGERY

## 2021-09-23 PROCEDURE — 3079F PR MOST RECENT DIASTOLIC BLOOD PRESSURE 80-89 MM HG: ICD-10-PCS | Mod: CPTII,S$GLB,, | Performed by: ORTHOPAEDIC SURGERY

## 2021-09-23 PROCEDURE — 1101F PR PT FALLS ASSESS DOC 0-1 FALLS W/OUT INJ PAST YR: ICD-10-PCS | Mod: CPTII,S$GLB,, | Performed by: ORTHOPAEDIC SURGERY

## 2021-09-23 PROCEDURE — 1125F PR PAIN SEVERITY QUANTIFIED, PAIN PRESENT: ICD-10-PCS | Mod: CPTII,S$GLB,, | Performed by: ORTHOPAEDIC SURGERY

## 2021-09-23 PROCEDURE — 3288F FALL RISK ASSESSMENT DOCD: CPT | Mod: CPTII,S$GLB,, | Performed by: ORTHOPAEDIC SURGERY

## 2021-09-23 PROCEDURE — 3079F DIAST BP 80-89 MM HG: CPT | Mod: CPTII,S$GLB,, | Performed by: ORTHOPAEDIC SURGERY

## 2021-09-23 PROCEDURE — 99204 OFFICE O/P NEW MOD 45 MIN: CPT | Mod: 25,S$GLB,, | Performed by: ORTHOPAEDIC SURGERY

## 2021-09-23 PROCEDURE — 1159F MED LIST DOCD IN RCRD: CPT | Mod: CPTII,S$GLB,, | Performed by: ORTHOPAEDIC SURGERY

## 2021-09-23 PROCEDURE — 3077F SYST BP >= 140 MM HG: CPT | Mod: CPTII,S$GLB,, | Performed by: ORTHOPAEDIC SURGERY

## 2021-09-23 PROCEDURE — 20610 LARGE JOINT ASPIRATION/INJECTION: R KNEE: ICD-10-PCS | Mod: RT,S$GLB,, | Performed by: ORTHOPAEDIC SURGERY

## 2021-09-23 PROCEDURE — 73564 X-RAY EXAM KNEE 4 OR MORE: CPT | Mod: TC,FY,RT

## 2021-09-23 PROCEDURE — 73564 XR KNEE COMP 4 OR MORE VIEWS RIGHT: ICD-10-PCS | Mod: 26,RT,, | Performed by: RADIOLOGY

## 2021-09-23 PROCEDURE — 1101F PT FALLS ASSESS-DOCD LE1/YR: CPT | Mod: CPTII,S$GLB,, | Performed by: ORTHOPAEDIC SURGERY

## 2021-09-23 PROCEDURE — 99999 PR PBB SHADOW E&M-EST. PATIENT-LVL III: ICD-10-PCS | Mod: PBBFAC,,, | Performed by: ORTHOPAEDIC SURGERY

## 2021-09-23 PROCEDURE — 20610 DRAIN/INJ JOINT/BURSA W/O US: CPT | Mod: RT,S$GLB,, | Performed by: ORTHOPAEDIC SURGERY

## 2021-09-23 PROCEDURE — 3077F PR MOST RECENT SYSTOLIC BLOOD PRESSURE >= 140 MM HG: ICD-10-PCS | Mod: CPTII,S$GLB,, | Performed by: ORTHOPAEDIC SURGERY

## 2021-09-23 PROCEDURE — 99204 PR OFFICE/OUTPT VISIT, NEW, LEVL IV, 45-59 MIN: ICD-10-PCS | Mod: 25,S$GLB,, | Performed by: ORTHOPAEDIC SURGERY

## 2021-09-23 PROCEDURE — 3288F PR FALLS RISK ASSESSMENT DOCUMENTED: ICD-10-PCS | Mod: CPTII,S$GLB,, | Performed by: ORTHOPAEDIC SURGERY

## 2021-09-23 PROCEDURE — 99999 PR PBB SHADOW E&M-EST. PATIENT-LVL III: CPT | Mod: PBBFAC,,, | Performed by: ORTHOPAEDIC SURGERY

## 2021-09-23 PROCEDURE — 1125F AMNT PAIN NOTED PAIN PRSNT: CPT | Mod: CPTII,S$GLB,, | Performed by: ORTHOPAEDIC SURGERY

## 2021-09-23 PROCEDURE — 73564 X-RAY EXAM KNEE 4 OR MORE: CPT | Mod: 26,RT,, | Performed by: RADIOLOGY

## 2021-09-23 RX ORDER — TRIAMCINOLONE ACETONIDE 40 MG/ML
40 INJECTION, SUSPENSION INTRA-ARTICULAR; INTRAMUSCULAR
Status: DISCONTINUED | OUTPATIENT
Start: 2021-09-23 | End: 2021-09-23 | Stop reason: HOSPADM

## 2021-09-23 RX ORDER — BUPIVACAINE HYDROCHLORIDE 5 MG/ML
5 INJECTION, SOLUTION PERINEURAL
Status: DISCONTINUED | OUTPATIENT
Start: 2021-09-23 | End: 2021-09-23 | Stop reason: HOSPADM

## 2021-09-23 RX ORDER — LIDOCAINE HYDROCHLORIDE 10 MG/ML
4 INJECTION INFILTRATION; PERINEURAL
Status: DISCONTINUED | OUTPATIENT
Start: 2021-09-23 | End: 2021-09-23 | Stop reason: HOSPADM

## 2021-09-23 RX ADMIN — BUPIVACAINE HYDROCHLORIDE 5 ML: 5 INJECTION, SOLUTION PERINEURAL at 09:09

## 2021-09-23 RX ADMIN — LIDOCAINE HYDROCHLORIDE 4 ML: 10 INJECTION INFILTRATION; PERINEURAL at 09:09

## 2021-09-23 RX ADMIN — TRIAMCINOLONE ACETONIDE 40 MG: 40 INJECTION, SUSPENSION INTRA-ARTICULAR; INTRAMUSCULAR at 09:09

## 2021-09-28 ENCOUNTER — IMMUNIZATION (OUTPATIENT)
Dept: INTERNAL MEDICINE | Facility: CLINIC | Age: 76
End: 2021-09-28
Payer: MEDICARE

## 2021-09-28 DIAGNOSIS — Z23 NEED FOR VACCINATION: Primary | ICD-10-CM

## 2021-09-28 PROCEDURE — 0003A COVID-19, MRNA, LNP-S, PF, 30 MCG/0.3 ML DOSE VACCINE: CPT | Mod: PBBFAC | Performed by: INTERNAL MEDICINE

## 2021-09-28 PROCEDURE — 91300 COVID-19, MRNA, LNP-S, PF, 30 MCG/0.3 ML DOSE VACCINE: CPT | Mod: PBBFAC | Performed by: INTERNAL MEDICINE

## 2022-05-05 ENCOUNTER — TELEPHONE (OUTPATIENT)
Dept: ORTHOPEDICS | Facility: CLINIC | Age: 77
End: 2022-05-05
Payer: MEDICARE

## 2022-05-05 NOTE — TELEPHONE ENCOUNTER
----- Message from Dianne Zarate sent at 5/5/2022  9:28 AM CDT -----  Contact: [phone]  Type:  Needs Medical Advice    Who Called: pt called  Would the patient rather a call back or a response via [facility]? Callback  Best Call Back Number: [phone]  Additional Information: pt would like to see  for an injection ASAP. Offered appt with kaushik but pt says she hooker not think she is experienced enough. Pt would like to see . Please call pt to advice

## 2022-05-05 NOTE — TELEPHONE ENCOUNTER
----- Message from Mauri Chatman sent at 5/5/2022  4:21 PM CDT -----  Contact: [phone]/self  Type:  Patient Returning Call    Who Called: pt   Who Left Message for Patient:Niles Arechiga  Does the patient know what this is regarding?:   Would the patient rather a call back or a response via [email or web address]? Call back   Best Call Back Number:[phone]  Additional Information: schedule appt for today

## 2022-05-17 ENCOUNTER — OFFICE VISIT (OUTPATIENT)
Dept: ORTHOPEDICS | Facility: CLINIC | Age: 77
End: 2022-05-17
Payer: MEDICARE

## 2022-05-17 VITALS
HEIGHT: 63 IN | BODY MASS INDEX: 32.3 KG/M2 | HEART RATE: 76 BPM | WEIGHT: 182.31 LBS | SYSTOLIC BLOOD PRESSURE: 133 MMHG | DIASTOLIC BLOOD PRESSURE: 69 MMHG

## 2022-05-17 DIAGNOSIS — M17.11 PRIMARY OSTEOARTHRITIS OF RIGHT KNEE: Primary | ICD-10-CM

## 2022-05-17 PROBLEM — M17.12 PRIMARY OSTEOARTHRITIS OF LEFT KNEE: Status: ACTIVE | Noted: 2022-05-17

## 2022-05-17 PROCEDURE — 99999 PR PBB SHADOW E&M-EST. PATIENT-LVL III: CPT | Mod: PBBFAC,,, | Performed by: ORTHOPAEDIC SURGERY

## 2022-05-17 PROCEDURE — 3078F PR MOST RECENT DIASTOLIC BLOOD PRESSURE < 80 MM HG: ICD-10-PCS | Mod: CPTII,S$GLB,, | Performed by: ORTHOPAEDIC SURGERY

## 2022-05-17 PROCEDURE — 1101F PR PT FALLS ASSESS DOC 0-1 FALLS W/OUT INJ PAST YR: ICD-10-PCS | Mod: CPTII,S$GLB,, | Performed by: ORTHOPAEDIC SURGERY

## 2022-05-17 PROCEDURE — 1101F PT FALLS ASSESS-DOCD LE1/YR: CPT | Mod: CPTII,S$GLB,, | Performed by: ORTHOPAEDIC SURGERY

## 2022-05-17 PROCEDURE — 3288F PR FALLS RISK ASSESSMENT DOCUMENTED: ICD-10-PCS | Mod: CPTII,S$GLB,, | Performed by: ORTHOPAEDIC SURGERY

## 2022-05-17 PROCEDURE — 20610 LARGE JOINT ASPIRATION/INJECTION: R KNEE: ICD-10-PCS | Mod: RT,S$GLB,, | Performed by: ORTHOPAEDIC SURGERY

## 2022-05-17 PROCEDURE — 1125F PR PAIN SEVERITY QUANTIFIED, PAIN PRESENT: ICD-10-PCS | Mod: CPTII,S$GLB,, | Performed by: ORTHOPAEDIC SURGERY

## 2022-05-17 PROCEDURE — 1159F MED LIST DOCD IN RCRD: CPT | Mod: CPTII,S$GLB,, | Performed by: ORTHOPAEDIC SURGERY

## 2022-05-17 PROCEDURE — 3075F PR MOST RECENT SYSTOLIC BLOOD PRESS GE 130-139MM HG: ICD-10-PCS | Mod: CPTII,S$GLB,, | Performed by: ORTHOPAEDIC SURGERY

## 2022-05-17 PROCEDURE — 99213 PR OFFICE/OUTPT VISIT, EST, LEVL III, 20-29 MIN: ICD-10-PCS | Mod: 25,S$GLB,, | Performed by: ORTHOPAEDIC SURGERY

## 2022-05-17 PROCEDURE — 20610 DRAIN/INJ JOINT/BURSA W/O US: CPT | Mod: RT,S$GLB,, | Performed by: ORTHOPAEDIC SURGERY

## 2022-05-17 PROCEDURE — 3075F SYST BP GE 130 - 139MM HG: CPT | Mod: CPTII,S$GLB,, | Performed by: ORTHOPAEDIC SURGERY

## 2022-05-17 PROCEDURE — 99213 OFFICE O/P EST LOW 20 MIN: CPT | Mod: 25,S$GLB,, | Performed by: ORTHOPAEDIC SURGERY

## 2022-05-17 PROCEDURE — 1159F PR MEDICATION LIST DOCUMENTED IN MEDICAL RECORD: ICD-10-PCS | Mod: CPTII,S$GLB,, | Performed by: ORTHOPAEDIC SURGERY

## 2022-05-17 PROCEDURE — 3288F FALL RISK ASSESSMENT DOCD: CPT | Mod: CPTII,S$GLB,, | Performed by: ORTHOPAEDIC SURGERY

## 2022-05-17 PROCEDURE — 99999 PR PBB SHADOW E&M-EST. PATIENT-LVL III: ICD-10-PCS | Mod: PBBFAC,,, | Performed by: ORTHOPAEDIC SURGERY

## 2022-05-17 PROCEDURE — 3078F DIAST BP <80 MM HG: CPT | Mod: CPTII,S$GLB,, | Performed by: ORTHOPAEDIC SURGERY

## 2022-05-17 PROCEDURE — 1125F AMNT PAIN NOTED PAIN PRSNT: CPT | Mod: CPTII,S$GLB,, | Performed by: ORTHOPAEDIC SURGERY

## 2022-05-17 RX ORDER — LIDOCAINE HYDROCHLORIDE 10 MG/ML
4 INJECTION INFILTRATION; PERINEURAL
Status: DISCONTINUED | OUTPATIENT
Start: 2022-05-17 | End: 2022-05-17 | Stop reason: HOSPADM

## 2022-05-17 RX ORDER — KETOROLAC TROMETHAMINE 30 MG/ML
30 INJECTION, SOLUTION INTRAMUSCULAR; INTRAVENOUS
Status: DISCONTINUED | OUTPATIENT
Start: 2022-05-17 | End: 2022-05-17 | Stop reason: HOSPADM

## 2022-05-17 RX ORDER — BUPIVACAINE HYDROCHLORIDE 5 MG/ML
5 INJECTION, SOLUTION PERINEURAL
Status: DISCONTINUED | OUTPATIENT
Start: 2022-05-17 | End: 2022-05-17 | Stop reason: HOSPADM

## 2022-05-17 RX ADMIN — LIDOCAINE HYDROCHLORIDE 4 ML: 10 INJECTION INFILTRATION; PERINEURAL at 02:05

## 2022-05-17 RX ADMIN — KETOROLAC TROMETHAMINE 30 MG: 30 INJECTION, SOLUTION INTRAMUSCULAR; INTRAVENOUS at 02:05

## 2022-05-17 RX ADMIN — BUPIVACAINE HYDROCHLORIDE 5 ML: 5 INJECTION, SOLUTION PERINEURAL at 02:05

## 2022-05-17 NOTE — PROCEDURES
Large Joint Aspiration/Injection: R knee    Date/Time: 5/17/2022 2:15 PM  Performed by: Jamie Tucker MD  Authorized by: Jamie Tucker MD     Consent Done?:  Yes (Verbal)  Indications:  Arthritis  Site marked: the procedure site was marked    Timeout: prior to procedure the correct patient, procedure, and site was verified    Prep: patient was prepped and draped in usual sterile fashion      Local anesthesia used?: Yes    Local anesthetic:  Topical anesthetic and lidocaine 1% without epinephrine    Details:  Needle Size:  22 G  Ultrasonic Guidance for needle placement?: No    Approach:  Anteromedial  Location:  Knee  Site:  R knee  Medications:  4 mL LIDOcaine HCL 10 mg/ml (1%) 10 mg/mL (1 %); 5 mL BUPivacaine 0.5 % (5 mg/mL); 30 mg ketorolac 30 mg/mL (1 mL)  Patient tolerance:  Patient tolerated the procedure well with no immediate complications

## 2022-05-17 NOTE — PROGRESS NOTES
Subjective:      Patient ID: Diane Reid is a 76 y.o. female.    Chief Complaint: Pain of the Right Knee    Knee Pain: right  swelling: yes  worsens with activity: yes  relieved by: injections- 4 mos relief      Social History     Occupational History    Not on file   Tobacco Use    Smoking status: Former Smoker     Years: 20.00     Quit date: 2006     Years since quittin.3    Smokeless tobacco: Never Used   Substance and Sexual Activity    Alcohol use: Yes     Comment: occasional    Drug use: No    Sexual activity: Not on file      Review of Systems   Constitutional: Negative for diaphoresis.   HENT: Negative for ear discharge, nosebleeds and stridor.    Eyes: Negative for photophobia.   Cardiovascular: Negative for syncope.   Respiratory: Negative for hemoptysis, shortness of breath and wheezing.    Neurological: Negative for tremors.   Psychiatric/Behavioral: Negative.          Objective:    General    Constitutional: She is oriented to person, place, and time. She appears well-developed.   HENT:   Head: Normocephalic and atraumatic.   Right Ear: External ear normal.   Left Ear: External ear normal.   Eyes: EOM are normal.   Cardiovascular: Intact distal pulses.    Neurological: She is alert and oriented to person, place, and time.   Psychiatric: She has a normal mood and affect. Her behavior is normal. Judgment and thought content normal.     General Musculoskeletal Exam   Gait: antalgic and abnormal       Right Knee Exam     Inspection   Swelling: present  Effusion: present    Tenderness   The patient is tender to palpation of the medial joint line.    Crepitus   The patient has crepitus of the patella.    Range of Motion   Flexion: abnormal     Other   Sensation: normal    Muscle Strength   Right Lower Extremity   Quadriceps:  4/5   Hamstrin/5          Assessment:       1. Primary osteoarthritis of right knee          Plan:       we injected the right knee w 1cc of ketorolac, marcaine  and lidocaine under sterile conditions with the patient's informed consent for mild/moderate knee oa. Will also order HA injections

## 2022-06-07 ENCOUNTER — TELEPHONE (OUTPATIENT)
Dept: ORTHOPEDICS | Facility: CLINIC | Age: 77
End: 2022-06-07
Payer: MEDICARE

## 2022-06-07 NOTE — TELEPHONE ENCOUNTER
----- Message from Cherie Porter sent at 6/7/2022  1:43 PM CDT -----  Type:  Patient  Call    Who Called: Pt   Would the patient rather a call back or a response via [facility]? Call back  Best Call Back Number: [phone]  Additional Information:  Pt is calling for Amberly about an appointment.... pt did not want to schedule appt when asked the nature of the call

## 2022-06-10 ENCOUNTER — RESEARCH ENCOUNTER (OUTPATIENT)
Dept: RESEARCH | Facility: HOSPITAL | Age: 77
End: 2022-06-10
Payer: MEDICARE

## 2022-06-10 NOTE — PROGRESS NOTES
"Protocol: innovations in Genicular Outcomes Registry (Marian)  Protocol#: Marian  IRB#: 2021.156  Version Date: 18-Mar-2022  PI: Jamie Tucker MD  Patient Initials: JT     Informed Consent Process:     Research team called the patient to discuss above mentioned protocol.     Patient expressed that she was not a good candidate for the study due to the belief that she "does not have arthritis" but "possibly has pain in the patellar". Patient stated that she will discuss the study further with Dr. Tucker along with her treatment and medical diagnosis next week at her appointment.   "

## 2022-06-14 ENCOUNTER — OFFICE VISIT (OUTPATIENT)
Dept: ORTHOPEDICS | Facility: CLINIC | Age: 77
End: 2022-06-14
Payer: MEDICARE

## 2022-06-14 ENCOUNTER — HOSPITAL ENCOUNTER (OUTPATIENT)
Dept: RADIOLOGY | Facility: HOSPITAL | Age: 77
Discharge: HOME | End: 2022-06-14
Attending: ORTHOPAEDIC SURGERY
Payer: MEDICARE

## 2022-06-14 ENCOUNTER — RESEARCH ENCOUNTER (OUTPATIENT)
Dept: RESEARCH | Facility: HOSPITAL | Age: 77
End: 2022-06-14
Payer: MEDICARE

## 2022-06-14 VITALS
WEIGHT: 182 LBS | BODY MASS INDEX: 32.25 KG/M2 | HEART RATE: 80 BPM | SYSTOLIC BLOOD PRESSURE: 148 MMHG | HEIGHT: 63 IN | DIASTOLIC BLOOD PRESSURE: 84 MMHG

## 2022-06-14 DIAGNOSIS — M17.11 PRIMARY OSTEOARTHRITIS OF RIGHT KNEE: Primary | ICD-10-CM

## 2022-06-14 DIAGNOSIS — M17.12 PRIMARY OSTEOARTHRITIS OF LEFT KNEE: ICD-10-CM

## 2022-06-14 PROCEDURE — 99999 PR PBB SHADOW E&M-EST. PATIENT-LVL III: CPT | Mod: PBBFAC,,, | Performed by: ORTHOPAEDIC SURGERY

## 2022-06-14 PROCEDURE — 99499 NO LOS: ICD-10-PCS | Mod: S$GLB,,, | Performed by: ORTHOPAEDIC SURGERY

## 2022-06-14 PROCEDURE — 3079F PR MOST RECENT DIASTOLIC BLOOD PRESSURE 80-89 MM HG: ICD-10-PCS | Mod: CPTII,S$GLB,, | Performed by: ORTHOPAEDIC SURGERY

## 2022-06-14 PROCEDURE — 3079F DIAST BP 80-89 MM HG: CPT | Mod: CPTII,S$GLB,, | Performed by: ORTHOPAEDIC SURGERY

## 2022-06-14 PROCEDURE — 1159F PR MEDICATION LIST DOCUMENTED IN MEDICAL RECORD: ICD-10-PCS | Mod: CPTII,S$GLB,, | Performed by: ORTHOPAEDIC SURGERY

## 2022-06-14 PROCEDURE — 3077F PR MOST RECENT SYSTOLIC BLOOD PRESSURE >= 140 MM HG: ICD-10-PCS | Mod: CPTII,S$GLB,, | Performed by: ORTHOPAEDIC SURGERY

## 2022-06-14 PROCEDURE — 3288F PR FALLS RISK ASSESSMENT DOCUMENTED: ICD-10-PCS | Mod: CPTII,S$GLB,, | Performed by: ORTHOPAEDIC SURGERY

## 2022-06-14 PROCEDURE — 20610 DRAIN/INJ JOINT/BURSA W/O US: CPT | Mod: RT,S$GLB,, | Performed by: ORTHOPAEDIC SURGERY

## 2022-06-14 PROCEDURE — 99999 PR PBB SHADOW E&M-EST. PATIENT-LVL III: ICD-10-PCS | Mod: PBBFAC,,, | Performed by: ORTHOPAEDIC SURGERY

## 2022-06-14 PROCEDURE — 99499 UNLISTED E&M SERVICE: CPT | Mod: S$GLB,,, | Performed by: ORTHOPAEDIC SURGERY

## 2022-06-14 PROCEDURE — 73564 X-RAY EXAM KNEE 4 OR MORE: CPT | Mod: 26,LT,, | Performed by: RADIOLOGY

## 2022-06-14 PROCEDURE — 1125F PR PAIN SEVERITY QUANTIFIED, PAIN PRESENT: ICD-10-PCS | Mod: CPTII,S$GLB,, | Performed by: ORTHOPAEDIC SURGERY

## 2022-06-14 PROCEDURE — 3077F SYST BP >= 140 MM HG: CPT | Mod: CPTII,S$GLB,, | Performed by: ORTHOPAEDIC SURGERY

## 2022-06-14 PROCEDURE — 1101F PR PT FALLS ASSESS DOC 0-1 FALLS W/OUT INJ PAST YR: ICD-10-PCS | Mod: CPTII,S$GLB,, | Performed by: ORTHOPAEDIC SURGERY

## 2022-06-14 PROCEDURE — 1159F MED LIST DOCD IN RCRD: CPT | Mod: CPTII,S$GLB,, | Performed by: ORTHOPAEDIC SURGERY

## 2022-06-14 PROCEDURE — 3288F FALL RISK ASSESSMENT DOCD: CPT | Mod: CPTII,S$GLB,, | Performed by: ORTHOPAEDIC SURGERY

## 2022-06-14 PROCEDURE — 20610 LARGE JOINT ASPIRATION/INJECTION: R KNEE: ICD-10-PCS | Mod: RT,S$GLB,, | Performed by: ORTHOPAEDIC SURGERY

## 2022-06-14 PROCEDURE — 73564 XR KNEE COMP 4 OR MORE VIEWS LEFT: ICD-10-PCS | Mod: 26,LT,, | Performed by: RADIOLOGY

## 2022-06-14 PROCEDURE — 1101F PT FALLS ASSESS-DOCD LE1/YR: CPT | Mod: CPTII,S$GLB,, | Performed by: ORTHOPAEDIC SURGERY

## 2022-06-14 PROCEDURE — 73564 X-RAY EXAM KNEE 4 OR MORE: CPT | Mod: TC,FY,LT

## 2022-06-14 PROCEDURE — 1125F AMNT PAIN NOTED PAIN PRSNT: CPT | Mod: CPTII,S$GLB,, | Performed by: ORTHOPAEDIC SURGERY

## 2022-06-14 NOTE — PROGRESS NOTES
Protocol: innovations in Genicular Outcomes Registry (Marian)  Protocol#: Marian  IRB#: 2021.156  Version Date: 18-Mar-2022  PI: Jamie Tucker MD  Patient Initials: JT     Informed Consent Process:     Research team discussed above mentioned protocol to patient.  Patient declined study at this time.

## 2022-06-14 NOTE — PROCEDURES
Large Joint Aspiration/Injection: R knee    Date/Time: 6/14/2022 1:15 PM  Performed by: Jamie Tucker MD  Authorized by: Jamie Tucker MD     Consent Done?:  Yes (Verbal)  Indications:  Arthritis  Site marked: the procedure site was marked    Timeout: prior to procedure the correct patient, procedure, and site was verified    Prep: patient was prepped and draped in usual sterile fashion      Local anesthesia used?: Yes    Local anesthetic:  Topical anesthetic    Details:  Needle Size:  22 G  Ultrasonic Guidance for needle placement?: No    Approach:  Superior  Location:  Knee  Site:  R knee  Medications:  60 mg hyaluronate sodium, stabilized 60 mg/3 mL

## 2022-07-12 ENCOUNTER — TELEPHONE (OUTPATIENT)
Dept: ADMINISTRATIVE | Facility: OTHER | Age: 77
End: 2022-07-12
Payer: MEDICARE

## 2022-07-12 ENCOUNTER — OFFICE VISIT (OUTPATIENT)
Dept: ORTHOPEDICS | Facility: CLINIC | Age: 77
End: 2022-07-12
Payer: MEDICARE

## 2022-07-12 VITALS
DIASTOLIC BLOOD PRESSURE: 93 MMHG | SYSTOLIC BLOOD PRESSURE: 161 MMHG | WEIGHT: 181.56 LBS | BODY MASS INDEX: 32.17 KG/M2 | HEART RATE: 74 BPM | HEIGHT: 63 IN

## 2022-07-12 DIAGNOSIS — M25.561 ACUTE PAIN OF RIGHT KNEE: Primary | ICD-10-CM

## 2022-07-12 DIAGNOSIS — M62.81 QUADRICEPS WEAKNESS: ICD-10-CM

## 2022-07-12 PROCEDURE — 1159F MED LIST DOCD IN RCRD: CPT | Mod: CPTII,S$GLB,, | Performed by: ORTHOPAEDIC SURGERY

## 2022-07-12 PROCEDURE — 3288F FALL RISK ASSESSMENT DOCD: CPT | Mod: CPTII,S$GLB,, | Performed by: ORTHOPAEDIC SURGERY

## 2022-07-12 PROCEDURE — 3077F PR MOST RECENT SYSTOLIC BLOOD PRESSURE >= 140 MM HG: ICD-10-PCS | Mod: CPTII,S$GLB,, | Performed by: ORTHOPAEDIC SURGERY

## 2022-07-12 PROCEDURE — 1125F AMNT PAIN NOTED PAIN PRSNT: CPT | Mod: CPTII,S$GLB,, | Performed by: ORTHOPAEDIC SURGERY

## 2022-07-12 PROCEDURE — 3080F DIAST BP >= 90 MM HG: CPT | Mod: CPTII,S$GLB,, | Performed by: ORTHOPAEDIC SURGERY

## 2022-07-12 PROCEDURE — 1101F PR PT FALLS ASSESS DOC 0-1 FALLS W/OUT INJ PAST YR: ICD-10-PCS | Mod: CPTII,S$GLB,, | Performed by: ORTHOPAEDIC SURGERY

## 2022-07-12 PROCEDURE — 99213 OFFICE O/P EST LOW 20 MIN: CPT | Mod: S$GLB,,, | Performed by: ORTHOPAEDIC SURGERY

## 2022-07-12 PROCEDURE — 3077F SYST BP >= 140 MM HG: CPT | Mod: CPTII,S$GLB,, | Performed by: ORTHOPAEDIC SURGERY

## 2022-07-12 PROCEDURE — 99999 PR PBB SHADOW E&M-EST. PATIENT-LVL III: ICD-10-PCS | Mod: PBBFAC,,, | Performed by: ORTHOPAEDIC SURGERY

## 2022-07-12 PROCEDURE — 99213 PR OFFICE/OUTPT VISIT, EST, LEVL III, 20-29 MIN: ICD-10-PCS | Mod: S$GLB,,, | Performed by: ORTHOPAEDIC SURGERY

## 2022-07-12 PROCEDURE — 1125F PR PAIN SEVERITY QUANTIFIED, PAIN PRESENT: ICD-10-PCS | Mod: CPTII,S$GLB,, | Performed by: ORTHOPAEDIC SURGERY

## 2022-07-12 PROCEDURE — 99999 PR PBB SHADOW E&M-EST. PATIENT-LVL III: CPT | Mod: PBBFAC,,, | Performed by: ORTHOPAEDIC SURGERY

## 2022-07-12 PROCEDURE — 1101F PT FALLS ASSESS-DOCD LE1/YR: CPT | Mod: CPTII,S$GLB,, | Performed by: ORTHOPAEDIC SURGERY

## 2022-07-12 PROCEDURE — 3080F PR MOST RECENT DIASTOLIC BLOOD PRESSURE >= 90 MM HG: ICD-10-PCS | Mod: CPTII,S$GLB,, | Performed by: ORTHOPAEDIC SURGERY

## 2022-07-12 PROCEDURE — 1159F PR MEDICATION LIST DOCUMENTED IN MEDICAL RECORD: ICD-10-PCS | Mod: CPTII,S$GLB,, | Performed by: ORTHOPAEDIC SURGERY

## 2022-07-12 PROCEDURE — 3288F PR FALLS RISK ASSESSMENT DOCUMENTED: ICD-10-PCS | Mod: CPTII,S$GLB,, | Performed by: ORTHOPAEDIC SURGERY

## 2022-07-12 RX ORDER — DICLOFENAC SODIUM 75 MG/1
75 TABLET, DELAYED RELEASE ORAL 2 TIMES DAILY
Qty: 28 TABLET | Refills: 0 | Status: SHIPPED | OUTPATIENT
Start: 2022-07-12 | End: 2022-07-26

## 2022-07-12 NOTE — PROGRESS NOTES
Subjective:      Patient ID: Diane Reid is a 76 y.o. female.    Chief Complaint: Pain of the Right Knee    Knee Pain: right  swelling: yes  worsens with activity: yes  relieved by: injections, 2 weeks relief  C/o worsenign mechanical symptoms with knee locking and catching.      Social History     Occupational History    Not on file   Tobacco Use    Smoking status: Former Smoker     Years: 20.00     Quit date: 2006     Years since quittin.5    Smokeless tobacco: Never Used   Substance and Sexual Activity    Alcohol use: Yes     Comment: occasional    Drug use: No    Sexual activity: Not on file      Review of Systems   Constitutional: Negative for diaphoresis.   HENT: Negative for ear discharge, nosebleeds and stridor.    Eyes: Negative for photophobia.   Cardiovascular: Negative for syncope.   Respiratory: Negative for hemoptysis, shortness of breath and wheezing.    Neurological: Negative for tremors.   Psychiatric/Behavioral: Negative.          Objective:    General    Constitutional: She is oriented to person, place, and time. She appears well-developed.   HENT:   Head: Normocephalic and atraumatic.   Right Ear: External ear normal.   Left Ear: External ear normal.   Eyes: EOM are normal.   Cardiovascular: Intact distal pulses.    Neurological: She is alert and oriented to person, place, and time.   Psychiatric: She has a normal mood and affect. Her behavior is normal. Judgment and thought content normal.     General Musculoskeletal Exam   Gait: antalgic and abnormal       Right Knee Exam     Inspection   Swelling: present  Effusion: present    Tenderness   The patient is tender to palpation of the medial joint line.    Crepitus   The patient has crepitus of the patella.    Range of Motion   Flexion: abnormal     Other   Sensation: normal    Muscle Strength   Right Lower Extremity   Quadriceps:  4/5   Hamstrin/5          Assessment:       1. Acute pain of right knee    2. Quadriceps  weakness          Plan:       we will try a course of PT.  I had a lengthy discussion with the patient regarding the natural history of meniscal tears and osteoarthritis as well as possible insufficiency fractures of the tibial plateau.  The patient is receiving minimal relief with NSAIDs, injections. At this point, given the acuity of the symptoms, mechanical in nature, physical examination and degenerative findings on radiographs, I think an MRI would be warranted to evaluate for meniscal pathology as well as for insufficiency fractures of the tibial plateua.  We will see if we can help arrange this. I will see the patient back once the MRI is complete.

## 2022-07-13 ENCOUNTER — CLINICAL SUPPORT (OUTPATIENT)
Dept: REHABILITATION | Facility: HOSPITAL | Age: 77
End: 2022-07-13
Payer: MEDICARE

## 2022-07-13 DIAGNOSIS — R26.9 GAIT ABNORMALITY: ICD-10-CM

## 2022-07-13 DIAGNOSIS — M25.561 ACUTE PAIN OF RIGHT KNEE: ICD-10-CM

## 2022-07-13 DIAGNOSIS — M62.81 QUADRICEPS WEAKNESS: ICD-10-CM

## 2022-07-13 DIAGNOSIS — R29.898 DECREASED STRENGTH OF LOWER EXTREMITY: ICD-10-CM

## 2022-07-13 DIAGNOSIS — M25.661 DECREASED RANGE OF MOTION (ROM) OF RIGHT KNEE: ICD-10-CM

## 2022-07-13 PROCEDURE — 97161 PT EVAL LOW COMPLEX 20 MIN: CPT | Mod: PO

## 2022-07-13 PROCEDURE — 97110 THERAPEUTIC EXERCISES: CPT | Mod: PO
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
## 2022-07-19 ENCOUNTER — CLINICAL SUPPORT (OUTPATIENT)
Dept: REHABILITATION | Facility: HOSPITAL | Age: 77
End: 2022-07-19
Payer: MEDICARE

## 2022-07-19 DIAGNOSIS — R29.898 DECREASED STRENGTH OF LOWER EXTREMITY: Primary | ICD-10-CM

## 2022-07-19 DIAGNOSIS — R26.9 GAIT ABNORMALITY: ICD-10-CM

## 2022-07-19 DIAGNOSIS — M25.661 DECREASED RANGE OF MOTION (ROM) OF RIGHT KNEE: ICD-10-CM

## 2022-07-19 PROCEDURE — 97112 NEUROMUSCULAR REEDUCATION: CPT | Mod: PO

## 2022-07-19 PROCEDURE — 97110 THERAPEUTIC EXERCISES: CPT | Mod: PO

## 2022-07-19 NOTE — PROGRESS NOTES
Physical Therapy Daily Treatment Note     Name: Diane Reid  Clinic Number: [id number]    Therapy Diagnosis:   Encounter Diagnoses   Name Primary?    Decreased strength of lower extremity Yes    Decreased range of motion (ROM) of right knee     Gait abnormality      Physician: Jamie Tucker MD    Visit Date: 7/19/2022    Physician Orders: PT Eval and Treat   Medical Diagnosis from Referral:   M25.561 (ICD-10-CM) - Acute pain of right knee   M62.81 (ICD-10-CM) - Quadriceps weakness      Evaluation Date: 7/13/2022  Authorization Period Expiration: 8/10/2022  Plan of Care Expiration: 9/9/2022  Progress Note Due: 8/15/2022  Visit # / Visits authorized: 1/11     1st FOTO Follow up:   2nd FOTO Follow up:     Time In: 800am  Time Out: 845am  Total Billable Time: 25 minutes    Precautions: Standard    Subjective     Pt reports: that she is having more pain in her R knee today. She states that she does not know if therapy is working for her. She is also having some pain in her L groin today.   She was compliant with home exercise program.  Response to previous treatment: no change  Functional change: ongoing    Pain: 4/10  Location: bilateral knee and L groin    Objective     **PT was 1 on 1 with patient for 25 minutes today**    Diane received therapeutic exercises to develop strength, endurance and ROM for 35 minutes including:    Nu step, 6 minutes (for knee mobility and CV endurance)  Hooklying bridges 2 x 10  Sidelying clamshells 2 x 10 reps with OrangeTB   SAQs with bolster, 2 x 10 B  SLRs, 2 x 10 B  DL shuttle press, 50#, 3 minutes    Diane received the following manual therapy techniques: Joint mobilizations were applied to the: R knee for 10 minutes, including:    Posterior tibial glides with IR, grade III (bilateral)  Long axis distraction of L hip, grade III    Diane participated in neuromuscular re-education activities to improve: Balance, Coordination and Kinesthetic for 0 minutes. The following activities  were included:      Diane participated in dynamic functional therapeutic activities to improve functional performance for 0  minutes, including:        Home Exercises Provided and Patient Education Provided     Education provided:   - PT POC, prognosis, HEP     Written Home Exercises Provided: Patient instructed to cont prior HEP.  Exercises were reviewed and Diane was able to demonstrate them prior to the end of the session.  Diane demonstrated good  understanding of the education provided.     See EMR under Patient Instructions for exercises provided 7/13/2022.    Assessment     Diane presented to PT today reporting B knee, L hip, low back and neck pain today. She was educated that she should not give up on PT at this time as she has only completed her initial evaluation. She lacks a bit of knee flexion with some pain at her end range so time was spent on mobilizing her knee to improve flexion today. Her L groin pain was reproduced with hip flexion and IR today so the pain seems to be originating from her hip joint. She was progressed with quad and hip strengthening exercises today with fair tolerance. She was educated on the importance of using physical activity and exercise to help control her knee and hip pain.     Diane Is progressing well towards her goals.   Pt prognosis is Good.     Pt will continue to benefit from skilled outpatient physical therapy to address the deficits listed in the problem list box on initial evaluation, provide pt/family education and to maximize pt's level of independence in the home and community environment.     Pt's spiritual, cultural and educational needs considered and pt agreeable to plan of care and goals.     Anticipated barriers to physical therapy: scheduling, age, chronicity of symptoms    Goals:   Short Term Goals: 4 weeks (progrssing)  1. Patient will be independent in initial HEP to help supplement PT.   2. Patient will report being able to sleep through the night  without pain waking her up to help improve quality of life.   3. Patient will improve right knee range of motion to symmetrical to left to help improve gait mechanics.      Long Term Goals: 8 weeks (progressing)  1. Patient will be independent in updated HEP to help maintain gains made in PT.  2. Patient will improve FOTO Limitation Score to </= 36% to show improvement in condition.   3. Patient will improve hip abduction MMT to >/= 4-/5 to help with gait mechanics.   4. Patient will improve knee MMT to >/=4+/5 to help with stair negotiation.   5. Patient will improve single leg balance to 10 sec to help decrease fall risk.     Plan     Continue to progress knee and mobility, as well as quad and glut strength     GILES SMITH, PT

## 2022-07-26 ENCOUNTER — CLINICAL SUPPORT (OUTPATIENT)
Dept: REHABILITATION | Facility: HOSPITAL | Age: 77
End: 2022-07-26
Payer: MEDICARE

## 2022-07-26 DIAGNOSIS — M25.661 DECREASED RANGE OF MOTION (ROM) OF RIGHT KNEE: ICD-10-CM

## 2022-07-26 DIAGNOSIS — R26.9 GAIT ABNORMALITY: ICD-10-CM

## 2022-07-26 DIAGNOSIS — R29.898 DECREASED STRENGTH OF LOWER EXTREMITY: Primary | ICD-10-CM

## 2022-07-26 PROCEDURE — 97140 MANUAL THERAPY 1/> REGIONS: CPT | Mod: PO

## 2022-07-26 PROCEDURE — 97110 THERAPEUTIC EXERCISES: CPT | Mod: PO

## 2022-07-26 NOTE — PROGRESS NOTES
Physical Therapy Daily Treatment Note     Name: Diane Reid  Clinic Number: [id number]    Therapy Diagnosis:   Encounter Diagnoses   Name Primary?    Decreased strength of lower extremity Yes    Decreased range of motion (ROM) of right knee     Gait abnormality      Physician: Jamie Tucker MD    Visit Date: 7/26/2022    Physician Orders: PT Eval and Treat   Medical Diagnosis from Referral:   M25.561 (ICD-10-CM) - Acute pain of right knee   M62.81 (ICD-10-CM) - Quadriceps weakness      Evaluation Date: 7/13/2022  Authorization Period Expiration: 8/10/2022  Plan of Care Expiration: 9/9/2022  Progress Note Due: 8/15/2022  Visit # / Visits authorized: 1/11     1st FOTO Follow up:   2nd FOTO Follow up:     Time In: 8:01 am  Time Out: 8:48 am  Total Billable Time: 47 minutes (billable = 24)     Precautions: Standard    Subjective     Pt reports: She is having the same pain she usually has. She was sore after last visit. She reports that her knee still bothers her and mainly when it is fully straight on the right. Her hip is also bothering her with certain motions in her groin area.   She was compliant with home exercise program.  Response to previous treatment: no change  Functional change: ongoing    Pain: 4/10  Location: bilateral knee and L groin    Objective     **PT was 1 on 1 with patient for 24 minutes today**    Diane received therapeutic exercises to develop strength, endurance and ROM for 29 minutes including:    Nu step, 6 minutes (for knee mobility and CV endurance)   Hooklying bridges 2 x 10  Sidelying clamshells 2 x 10 reps with OrangeTB   Short Arc Quads 2x10 reps with 2#   Shuttle DL press 3x10 reps with 50#   Shuttle SL press 2x10 reps (1x10 each side) with 25#   - only 10 reps on each completed secondary to patient reporting increased pain with activity   Lateral band walks 2x10 feet each with OrangeTB     Not Today:  SLRs, 2 x 10 B    Diane received the following manual therapy techniques: Joint  mobilizations were applied to the: R knee for 10 minutes, including:    Posterior tibial glides with IR, grade III (bilateral)  Long axis distraction of L hip, grade III    Diane participated in neuromuscular re-education activities to improve: Balance, Coordination and Kinesthetic for 8 minutes. The following activities were included:    Step ups on 4-inch step 2x10 reps   Tandem stance balance with vertical ball raises 2x10 reps on blue foam     Diane participated in dynamic functional therapeutic activities to improve functional performance for 0  minutes, including:      Home Exercises Provided and Patient Education Provided     Education provided:   - PT POC, prognosis, HEP     Written Home Exercises Provided: Patient instructed to cont prior HEP.  Exercises were reviewed and Diane was able to demonstrate them prior to the end of the session.  Diane demonstrated good  understanding of the education provided.     See EMR under Patient Instructions for exercises provided 7/13/2022.    Assessment     Diane presents to physical therapy session with continued complaints of knee pain with the right greater than left. And continued complaints of hip and low back pain. She was educated on importance of continuing to move and improving joint mobility to help improve her range of motion then working through new range of motion. She continues with limited patellar and hip mobility and time spent improving her mobility with manual therapy with good tolerance. Continued emphasis on addressing her glute/hip strengthening to help offload her knees. Will continue to monitor and progress as able.     Diane Is progressing well towards her goals.   Pt prognosis is Good.     Pt will continue to benefit from skilled outpatient physical therapy to address the deficits listed in the problem list box on initial evaluation, provide pt/family education and to maximize pt's level of independence in the home and community environment.      Pt's spiritual, cultural and educational needs considered and pt agreeable to plan of care and goals.     Anticipated barriers to physical therapy: scheduling, age, chronicity of symptoms    Goals:   Short Term Goals: 4 weeks (progrssing)  1. Patient will be independent in initial HEP to help supplement PT.   2. Patient will report being able to sleep through the night without pain waking her up to help improve quality of life.   3. Patient will improve right knee range of motion to symmetrical to left to help improve gait mechanics.      Long Term Goals: 8 weeks (progressing)  1. Patient will be independent in updated HEP to help maintain gains made in PT.  2. Patient will improve FOTO Limitation Score to </= 36% to show improvement in condition.   3. Patient will improve hip abduction MMT to >/= 4-/5 to help with gait mechanics.   4. Patient will improve knee MMT to >/=4+/5 to help with stair negotiation.   5. Patient will improve single leg balance to 10 sec to help decrease fall risk.     Plan   Plan of care Certification: 7/13/2022 to 9/9/2022.    Continue to progress knee and mobility, as well as quad and glut strength     Jaclyn Cloud, PT

## 2022-07-27 NOTE — PROGRESS NOTES
Physical Therapy Daily Treatment Note     Name: Diane Reid  Clinic Number: [id number]    Therapy Diagnosis:   Encounter Diagnoses   Name Primary?    Decreased strength of lower extremity Yes    Decreased range of motion (ROM) of right knee     Gait abnormality      Physician: Jamie Tucker MD    Visit Date: 7/28/2022    Physician Orders: PT Eval and Treat   Medical Diagnosis from Referral:   M25.561 (ICD-10-CM) - Acute pain of right knee   M62.81 (ICD-10-CM) - Quadriceps weakness      Evaluation Date: 7/13/2022  Authorization Period Expiration: 8/10/2022  Plan of Care Expiration: 9/9/2022  Progress Note Due: 8/15/2022  Visit # / Visits authorized: 3/11     1st FOTO Follow up:   2nd FOTO Follow up:     Time In: 804am  Time Out: 855am  Total Billable Time: 30 minutes    Precautions: Standard    Subjective     Pt reports: that she remains with R knee pain and is having a bit more L hip pain today.  She was compliant with home exercise program.  Response to previous treatment: no change  Functional change: ongoing    Pain: 4/10  Location: bilateral knee and L groin    Objective     **PT was 1 on 1 with patient for 30 minutes today**    Diane received therapeutic exercises to develop strength, endurance and ROM for 30 minutes including:    Hooklying bridges 2 x 10  Sidelying clamshells 2 x 10 reps with OrangeTB   Short Arc Quads 2x10 reps with 2#   Shuttle DL press 3x10 reps with 50#   Shuttle SL press 2x10 reps (1x10 each side) with 25#   - only 10 reps on each completed secondary to patient reporting increased pain with activity   Lateral band walks 2x10 feet each with OrangeTB     Not Today:  SLRs, 2 x 10 B  Nu step, 6 minutes (for knee mobility and CV endurance)     Diane received the following manual therapy techniques: Joint mobilizations were applied to the: R knee for 10 minutes, including:    Posterior tibial glides with IR, grade III (bilateral)  Long axis distraction of L hip, grade III    Diane  participated in neuromuscular re-education activities to improve: Balance, Coordination and Kinesthetic for 00 minutes. The following activities were included:    Step ups on 4-inch step 2x10 reps   Tandem stance balance with vertical ball raises 2x10 reps on blue foam     Diane participated in dynamic functional therapeutic activities to improve functional performance for 0  minutes, including:      Home Exercises Provided and Patient Education Provided     Education provided:   - PT POC, prognosis, HEP     Written Home Exercises Provided: Patient instructed to cont prior HEP.  Exercises were reviewed and Diane was able to demonstrate them prior to the end of the session.  Diane demonstrated good  understanding of the education provided.     See EMR under Patient Instructions for exercises provided 7/13/2022.    Assessment     Diane remains with R knee and L hip pain today. She continues to express how she us unaware how PT with help with her symptoms. She is being educated on the idea of improve her ROM and strength to help offload her painful joint and improve her symptoms. Quad and hip strengthening exercises continue to be be performed and overall she is tolerating treatment well.     Diane Is progressing well towards her goals.   Pt prognosis is Good.     Pt will continue to benefit from skilled outpatient physical therapy to address the deficits listed in the problem list box on initial evaluation, provide pt/family education and to maximize pt's level of independence in the home and community environment.     Pt's spiritual, cultural and educational needs considered and pt agreeable to plan of care and goals.     Anticipated barriers to physical therapy: scheduling, age, chronicity of symptoms    Goals:   Short Term Goals: 4 weeks (progrssing)  1. Patient will be independent in initial HEP to help supplement PT.   2. Patient will report being able to sleep through the night without pain waking her up to help  improve quality of life.   3. Patient will improve right knee range of motion to symmetrical to left to help improve gait mechanics.      Long Term Goals: 8 weeks (progressing)  1. Patient will be independent in updated HEP to help maintain gains made in PT.  2. Patient will improve FOTO Limitation Score to </= 36% to show improvement in condition.   3. Patient will improve hip abduction MMT to >/= 4-/5 to help with gait mechanics.   4. Patient will improve knee MMT to >/=4+/5 to help with stair negotiation.   5. Patient will improve single leg balance to 10 sec to help decrease fall risk.     Plan   Plan of care Certification: 7/13/2022 to 9/9/2022.    Continue to progress knee and mobility, as well as quad and glut strength     GILES SMITH, PT

## 2022-07-28 ENCOUNTER — CLINICAL SUPPORT (OUTPATIENT)
Dept: REHABILITATION | Facility: HOSPITAL | Age: 77
End: 2022-07-28
Payer: MEDICARE

## 2022-07-28 DIAGNOSIS — R29.898 DECREASED STRENGTH OF LOWER EXTREMITY: Primary | ICD-10-CM

## 2022-07-28 DIAGNOSIS — M25.661 DECREASED RANGE OF MOTION (ROM) OF RIGHT KNEE: ICD-10-CM

## 2022-07-28 DIAGNOSIS — R26.9 GAIT ABNORMALITY: ICD-10-CM

## 2022-07-28 PROCEDURE — 97110 THERAPEUTIC EXERCISES: CPT | Mod: PO

## 2022-07-28 PROCEDURE — 97140 MANUAL THERAPY 1/> REGIONS: CPT | Mod: PO

## 2022-08-01 ENCOUNTER — HOSPITAL ENCOUNTER (OUTPATIENT)
Dept: RADIOLOGY | Facility: HOSPITAL | Age: 77
Discharge: HOME | End: 2022-08-01
Attending: ORTHOPAEDIC SURGERY
Payer: MEDICARE

## 2022-08-01 DIAGNOSIS — M62.81 QUADRICEPS WEAKNESS: ICD-10-CM

## 2022-08-01 DIAGNOSIS — M25.561 ACUTE PAIN OF RIGHT KNEE: ICD-10-CM

## 2022-08-01 PROCEDURE — 73721 MRI JNT OF LWR EXTRE W/O DYE: CPT | Mod: TC,RT

## 2022-08-01 PROCEDURE — 73721 MRI KNEE WITHOUT CONTRAST RIGHT: ICD-10-PCS | Mod: 26,RT,, | Performed by: RADIOLOGY

## 2022-08-01 PROCEDURE — 73721 MRI JNT OF LWR EXTRE W/O DYE: CPT | Mod: 26,RT,, | Performed by: RADIOLOGY

## 2022-08-02 ENCOUNTER — CLINICAL SUPPORT (OUTPATIENT)
Dept: REHABILITATION | Facility: HOSPITAL | Age: 77
End: 2022-08-02
Payer: MEDICARE

## 2022-08-02 DIAGNOSIS — R26.9 GAIT ABNORMALITY: ICD-10-CM

## 2022-08-02 DIAGNOSIS — M25.661 DECREASED RANGE OF MOTION (ROM) OF RIGHT KNEE: ICD-10-CM

## 2022-08-02 DIAGNOSIS — R29.898 DECREASED STRENGTH OF LOWER EXTREMITY: Primary | ICD-10-CM

## 2022-08-02 PROCEDURE — 97140 MANUAL THERAPY 1/> REGIONS: CPT | Mod: PO

## 2022-08-02 PROCEDURE — 97110 THERAPEUTIC EXERCISES: CPT | Mod: PO

## 2022-08-02 PROCEDURE — 97112 NEUROMUSCULAR REEDUCATION: CPT | Mod: PO

## 2022-08-02 NOTE — PROGRESS NOTES
Physical Therapy Daily Treatment Note     Name: Diane Reid  Clinic Number: [id number]    Therapy Diagnosis:   Encounter Diagnoses   Name Primary?    Decreased strength of lower extremity Yes    Decreased range of motion (ROM) of right knee     Gait abnormality      Physician: Jamie Tucker MD    Visit Date: 8/2/2022    Physician Orders: PT Eval and Treat   Medical Diagnosis from Referral:   M25.561 (ICD-10-CM) - Acute pain of right knee   M62.81 (ICD-10-CM) - Quadriceps weakness      Evaluation Date: 7/13/2022  Authorization Period Expiration: 8/10/2022  Plan of Care Expiration: 9/9/2022  Progress Note Due: 8/15/2022  Visit # / Visits authorized: 4/11     1st FOTO Follow up:   2nd FOTO Follow up:     Time In: 802am  Time Out: 857am  Total Billable Time: 55 minutes    Precautions: Standard    Subjective     Pt reports: that her L hip is giving her more trouble today. She had an MRI earlier this week and is waiting to hear back from the doctor about the results.   She was compliant with home exercise program.  Response to previous treatment: no change  Functional change: ongoing    Pain: 4/10  Location: bilateral knee and L groin    Objective       Diane received therapeutic exercises to develop strength, endurance and ROM for 33 minutes including:    SLRs, 2 x 10 B  Nu step, 6 minutes (for knee mobility and CV endurance)   Hooklying bridges 2 x 10  Sidelying clamshells 2 x 10 reps with OrangeTB   Short Arc Quads 2x10 reps with 2#   Shuttle DL press 3x10 reps with 50#   Shuttle SL press 2x10 reps (1x10 each side) with 25#   Lateral band walks 2x10 feet each with OrangeTB     Diane received the following manual therapy techniques: Joint mobilizations were applied to the: R knee for 12 minutes, including:    Seated edge of mat, posterior ttibial glides with distraction, grade III (R side only)  Long axis distraction of L hip, grade III  Lateral hip mobs with mobilization strap, grade III (L side only)    Diane  "participated in neuromuscular re-education activities to improve: Balance, Coordination and Kinesthetic for 10 minutes. The following activities were included:    SL stance on the floor, 3 x 10" each LE  NBOS on foam pad with ball toss from PT, 2 x 15 throws  Semi tandem stance on foam pad with ball toss from PT, 2 x 15 throw    Diane participated in dynamic functional therapeutic activities to improve functional performance for 0  minutes, including:      Home Exercises Provided and Patient Education Provided     Education provided:   - PT POC, prognosis, HEP     Written Home Exercises Provided: Patient instructed to cont prior HEP.  Exercises were reviewed and Diane was able to demonstrate them prior to the end of the session.  Diane demonstrated good  understanding of the education provided.     See EMR under Patient Instructions for exercises provided 7/13/2022.    Assessment     Diane continues with increased L hip pain today that she reported at her previous treatment session. Increased time was spent on manual interventions today to help control her hip and knee pain. She continues to be challenged with hip and quad strengthening exercises. She was further progress with static and dynamic balance exercises today with good tolerance.     Diane Is progressing well towards her goals.   Pt prognosis is Good.     Pt will continue to benefit from skilled outpatient physical therapy to address the deficits listed in the problem list box on initial evaluation, provide pt/family education and to maximize pt's level of independence in the home and community environment.     Pt's spiritual, cultural and educational needs considered and pt agreeable to plan of care and goals.     Anticipated barriers to physical therapy: scheduling, age, chronicity of symptoms    Goals:   Short Term Goals: 4 weeks (progrssing)  1. Patient will be independent in initial HEP to help supplement PT.   2. Patient will report being able to " sleep through the night without pain waking her up to help improve quality of life.   3. Patient will improve right knee range of motion to symmetrical to left to help improve gait mechanics.      Long Term Goals: 8 weeks (progressing)  1. Patient will be independent in updated HEP to help maintain gains made in PT.  2. Patient will improve FOTO Limitation Score to </= 36% to show improvement in condition.   3. Patient will improve hip abduction MMT to >/= 4-/5 to help with gait mechanics.   4. Patient will improve knee MMT to >/=4+/5 to help with stair negotiation.   5. Patient will improve single leg balance to 10 sec to help decrease fall risk.     Plan   Plan of care Certification: 7/13/2022 to 9/9/2022.    Continue to progress knee and mobility, as well as quad and glut strength     GILES SMITH, PT

## 2022-08-04 ENCOUNTER — CLINICAL SUPPORT (OUTPATIENT)
Dept: REHABILITATION | Facility: HOSPITAL | Age: 77
End: 2022-08-04
Payer: MEDICARE

## 2022-08-04 DIAGNOSIS — R26.9 GAIT ABNORMALITY: ICD-10-CM

## 2022-08-04 DIAGNOSIS — R29.898 DECREASED STRENGTH OF LOWER EXTREMITY: Primary | ICD-10-CM

## 2022-08-04 DIAGNOSIS — M25.661 DECREASED RANGE OF MOTION (ROM) OF RIGHT KNEE: ICD-10-CM

## 2022-08-04 PROCEDURE — 97140 MANUAL THERAPY 1/> REGIONS: CPT | Mod: PO

## 2022-08-04 PROCEDURE — 97112 NEUROMUSCULAR REEDUCATION: CPT | Mod: PO

## 2022-08-04 PROCEDURE — 97110 THERAPEUTIC EXERCISES: CPT | Mod: PO

## 2022-08-04 NOTE — PROGRESS NOTES
Physical Therapy Daily Treatment Note     Name: Diane Reid  Clinic Number: [id number]    Therapy Diagnosis:   Encounter Diagnoses   Name Primary?    Decreased strength of lower extremity Yes    Decreased range of motion (ROM) of right knee     Gait abnormality      Physician: Jamie Tucker MD    Visit Date: 8/4/2022    Physician Orders: PT Eval and Treat   Medical Diagnosis from Referral:   M25.561 (ICD-10-CM) - Acute pain of right knee   M62.81 (ICD-10-CM) - Quadriceps weakness      Evaluation Date: 7/13/2022  Authorization Period Expiration: 8/10/2022  Plan of Care Expiration: 9/9/2022  Progress Note Due: 8/15/2022  Visit # / Visits authorized: 5/11     1st FOTO Follow up:   2nd FOTO Follow up:     Time In: 801am  Time Out: 856am  Total Billable Time: 55 minutes    Precautions: Standard    Subjective     Pt reports: that she was sore from her last treatment session. She is still having a good amount of pain in her R knee and hip.   She was compliant with home exercise program.  Response to previous treatment: no change  Functional change: ongoing    Pain: 4/10  Location: bilateral knee and L groin    Objective       Diane received therapeutic exercises to develop strength, endurance and ROM for 30 minutes including:    SLRs 1#, 2 x 10 B  Hooklying bridges 2 x 10  Sidelying clamshells 2 x 10 reps with OrangeTB   Short Arc Quads 2x10 reps with 2#   Shuttle DL press 3x10 reps with 50#   Lateral band walks 2x10 feet each with OrangeTB     Diane received the following manual therapy techniques: Joint mobilizations were applied to the: R knee for 12 minutes, including:    Seated edge of mat, posterior ttibial glides with distraction, grade III (R side only)  Long axis distraction of L hip, grade III  Lateral hip mobs with mobilization strap, grade III (L side only)    Diane participated in neuromuscular re-education activities to improve: Balance, Coordination and Kinesthetic for 13 minutes. The following  "activities were included:    SL stance on the floor, 3 x 10" each LE  NBOS on foam pad with ball toss from PT, 2 x 15 throws  Semi tandem stance on foam pad with ball toss from PT, 2 x 15 throw    Diane participated in dynamic functional therapeutic activities to improve functional performance for 0  minutes, including:      Home Exercises Provided and Patient Education Provided     Education provided:   - PT POC, prognosis, HEP     Written Home Exercises Provided: Patient instructed to cont prior HEP.  Exercises were reviewed and Diane was able to demonstrate them prior to the end of the session.  Diane demonstrated good  understanding of the education provided.     See EMR under Patient Instructions for exercises provided 7/13/2022.    Assessment     Diane remains with pain and stiffness throughout the R knee and L hip so manual interventions continue to be applied. She continues to be challenged with quad and hip strengthening exercises. The bike was held today due to her reports of discomfort from using it earlier in the week. Dynamic balances exercises continue to be performed and are challenging her appropriately.     Diane Is progressing well towards her goals.   Pt prognosis is Good.     Pt will continue to benefit from skilled outpatient physical therapy to address the deficits listed in the problem list box on initial evaluation, provide pt/family education and to maximize pt's level of independence in the home and community environment.     Pt's spiritual, cultural and educational needs considered and pt agreeable to plan of care and goals.     Anticipated barriers to physical therapy: scheduling, age, chronicity of symptoms    Goals:   Short Term Goals: 4 weeks (progrssing)  1. Patient will be independent in initial HEP to help supplement PT.   2. Patient will report being able to sleep through the night without pain waking her up to help improve quality of life.   3. Patient will improve right knee " range of motion to symmetrical to left to help improve gait mechanics.      Long Term Goals: 8 weeks (progressing)  1. Patient will be independent in updated HEP to help maintain gains made in PT.  2. Patient will improve FOTO Limitation Score to </= 36% to show improvement in condition.   3. Patient will improve hip abduction MMT to >/= 4-/5 to help with gait mechanics.   4. Patient will improve knee MMT to >/=4+/5 to help with stair negotiation.   5. Patient will improve single leg balance to 10 sec to help decrease fall risk.     Plan   Plan of care Certification: 7/13/2022 to 9/9/2022.    Continue to progress knee and mobility, as well as quad and glut strength     GILES SMITH, PT

## 2022-08-08 ENCOUNTER — CLINICAL SUPPORT (OUTPATIENT)
Dept: REHABILITATION | Facility: HOSPITAL | Age: 77
End: 2022-08-08
Payer: MEDICARE

## 2022-08-08 DIAGNOSIS — R26.9 GAIT ABNORMALITY: ICD-10-CM

## 2022-08-08 DIAGNOSIS — R29.898 DECREASED STRENGTH OF LOWER EXTREMITY: Primary | ICD-10-CM

## 2022-08-08 DIAGNOSIS — M25.661 DECREASED RANGE OF MOTION (ROM) OF RIGHT KNEE: ICD-10-CM

## 2022-08-08 PROCEDURE — 97112 NEUROMUSCULAR REEDUCATION: CPT | Mod: PO

## 2022-08-08 PROCEDURE — 97140 MANUAL THERAPY 1/> REGIONS: CPT | Mod: PO

## 2022-08-08 PROCEDURE — 97110 THERAPEUTIC EXERCISES: CPT | Mod: PO

## 2022-08-08 NOTE — PROGRESS NOTES
Physical Therapy Re-Assessment/Treatment Note     Name: Diane Reid  [facility] Number: [id number]    Therapy Diagnosis:   Encounter Diagnoses   Name Primary?    Decreased strength of lower extremity Yes    Decreased range of motion (ROM) of right knee     Gait abnormality      Physician: Jamie Tucker MD    Visit Date: 8/8/2022    Physician Orders: PT Eval and Treat   Medical Diagnosis from Referral:   M25.561 (ICD-10-CM) - Acute pain of right knee   M62.81 (ICD-10-CM) - Quadriceps weakness      Evaluation Date: 7/13/2022  Authorization Period Expiration: 8/10/2022  Plan of Care Expiration: 9/9/2022  Progress Note Due: 9/6/2022  Most Recent: 8/8/2022  Visit # / Visits authorized: 6/11     1st FOTO Follow up:   2nd FOTO Follow up:     Time In: 8:00 am  Time Out: 8:54 am  Total Billable Time: 54 minutes    Precautions: Standard    Subjective     Pt reports: She sees her doctor tomorrow for a follow-up about her MRI as well as possible collagen injections. She feels that her pain is about the same since starting physical therapy mainly her hip bothers her more though than her knee. She thinks therapy has been helpful and forces her to keep moving. She is trying to get into therapy for her neck as well.    She was compliant with home exercise program.  Response to previous treatment: no change  Functional change: ongoing    Pain: 4/10  Location: bilateral knee and L groin    Objective     Range of Motion:  Lumbar     Percentage  Pain   Flexion 100% No Pain   Extension 80% Pain (low back)   Right Sidebend 75% No Pain   Left Sidebend 75% No Pain   Right Rotation 90% No Pain   Left Rotation 90% No Pain     Hip (PROM)    Right Left    Flexion 95 degrees  100 degrees    Internal Rotation 10 degrees  5 degrees    External Rotation 40 degrees  40 degrees     *Pt reported pain in left hip with internal rotation     Knee (AROM/PROM)    Right Left    Flexion 125 degrees / 130 degrees  130 degrees 130 degrees    Extension  -2  "degrees / 0 degrees   0 degrees / 0 degrees     *Pt reported pain with forced extension on right knee      Lower Extremity Strength (MMT):    Right  Left    Hip Flexion 4/5 4/5   Hip Abduction 3+/5 3/5   Knee Flexion 4/5 4+/5   Knee Extension 4/5 5/5   Dorsiflexion 4+/5 4+/5   Plantarflexion  4+/5 4+/5   *Plantarflexion measured in modified position seated edge of mat     Diane received therapeutic exercises to develop strength, endurance and ROM for 25 minutes including:    NuStep x5 minutes level 2   Hooklying bridges 2 x 10  Sidelying clamshells 2 x 10 reps with GreenTB   Straight Leg Raises 2x10 reps   Shuttle Double Leg Squats 3x10 reps with 62.5#  Shuttle Single Leg Squats 2x10 reps with 25#    Not Today:  Short Arc Quads 2x10 reps with 2#   Lateral band walks 2x10 feet each with OrangeTB     Diane received the following manual therapy techniques: Joint mobilizations were applied to the: knee and hip for 16 minutes, including:    Assessment as above   Patellar mobilizations   Tibial internal rotation mobilizations   Seated edge of mat distraction with anterior to posterior mobilizations   Long axis hip distraction grade III  Lateral hip distraction grade III    Diane participated in neuromuscular re-education activities to improve: Balance, Coordination and Kinesthetic for 13 minutes. The following activities were included:    Sit to stand hip hinge pattern 2x10 reps with GreenTB around knees   NBOS on foam pad with ball toss from PT, 2 x 15 throws  Semi tandem stance on foam pad with ball toss from PT, 2 x 15 throw    Not Today:   SL stance on the floor, 3 x 10" each LE    Diane participated in dynamic functional therapeutic activities to improve functional performance for 00 minutes, including:      Home Exercises Provided and Patient Education Provided     Education provided:   - PT POC, prognosis, HEP     Written Home Exercises Provided: Patient instructed to cont prior HEP.  Exercises were reviewed and " Diane was able to demonstrate them prior to the end of the session.  Diane demonstrated good  understanding of the education provided.     See EMR under Patient Instructions for exercises provided 7/13/2022.    Assessment     Diane has been seen for a total of 6 visits for a medical diagnosis of M25.561 (ICD-10-CM) - Acute pain of right knee and M62.81 (ICD-10-CM) - Quadriceps weakness. She has shown improvement in her knee range of motion almost symmetrical to her left. She continues with limited hip mobility causing her increased pain with weight bearing activities and ability to get into certain positions. She was educated on her progress with her knee range of motion and plans moving forward with therapy with continued focus on strengthening her hips and glutes to help offload her knee. She tolerated all therex well today with no adverse effects. Continued manual therapy intervention to address her mobility deficits. She conintues to be challenged with dynamic balance as well with good tolerance. Will continue to monitor and progress as able.     Diane Is progressing well towards her goals.   Pt prognosis is Good.     Pt will continue to benefit from skilled outpatient physical therapy to address the deficits listed in the problem list box on initial evaluation, provide pt/family education and to maximize pt's level of independence in the home and community environment.     Pt's spiritual, cultural and educational needs considered and pt agreeable to plan of care and goals.     Anticipated barriers to physical therapy: scheduling, age, chronicity of symptoms    Goals:   Short Term Goals: 4 weeks (progrssing)  1. Patient will be independent in initial HEP to help supplement PT. - MET   2. Patient will report being able to sleep through the night without pain waking her up to help improve quality of life. - MET   3. Patient will improve right knee range of motion to symmetrical to left to help improve gait  mechanics.      Long Term Goals: 8 weeks (progressing)  1. Patient will be independent in updated HEP to help maintain gains made in PT.  2. Patient will improve FOTO Limitation Score to </= 36% to show improvement in condition.   3. Patient will improve hip abduction MMT to >/= 4-/5 to help with gait mechanics.   4. Patient will improve knee MMT to >/=4+/5 to help with stair negotiation.   5. Patient will improve single leg balance to 10 sec to help decrease fall risk.     Plan   Plan of care Certification: 7/13/2022 to 9/9/2022.    Continue to progress knee mobility, as well as quad and glute strength     Jaclyn Cloud, PT

## 2022-08-09 ENCOUNTER — HOSPITAL ENCOUNTER (OUTPATIENT)
Dept: RADIOLOGY | Facility: HOSPITAL | Age: 77
Discharge: HOME | End: 2022-08-09
Attending: ORTHOPAEDIC SURGERY
Payer: MEDICARE

## 2022-08-09 ENCOUNTER — OFFICE VISIT (OUTPATIENT)
Dept: ORTHOPEDICS | Facility: CLINIC | Age: 77
End: 2022-08-09
Payer: MEDICARE

## 2022-08-09 ENCOUNTER — CLINICAL SUPPORT (OUTPATIENT)
Dept: LAB | Facility: HOSPITAL | Age: 77
End: 2022-08-09
Attending: ORTHOPAEDIC SURGERY
Payer: MEDICARE

## 2022-08-09 VITALS
HEIGHT: 63 IN | WEIGHT: 181.44 LBS | SYSTOLIC BLOOD PRESSURE: 147 MMHG | BODY MASS INDEX: 32.15 KG/M2 | DIASTOLIC BLOOD PRESSURE: 54 MMHG | HEART RATE: 65 BPM

## 2022-08-09 DIAGNOSIS — M25.561 ACUTE PAIN OF RIGHT KNEE: ICD-10-CM

## 2022-08-09 DIAGNOSIS — M25.561 ACUTE PAIN OF RIGHT KNEE: Primary | ICD-10-CM

## 2022-08-09 DIAGNOSIS — Z01.818 PRE-OP TESTING: ICD-10-CM

## 2022-08-09 PROCEDURE — 99215 PR OFFICE/OUTPT VISIT, EST, LEVL V, 40-54 MIN: ICD-10-PCS | Mod: S$GLB,,, | Performed by: ORTHOPAEDIC SURGERY

## 2022-08-09 PROCEDURE — 1125F AMNT PAIN NOTED PAIN PRSNT: CPT | Mod: CPTII,S$GLB,, | Performed by: ORTHOPAEDIC SURGERY

## 2022-08-09 PROCEDURE — 99999 PR PBB SHADOW E&M-EST. PATIENT-LVL V: ICD-10-PCS | Mod: PBBFAC,,, | Performed by: ORTHOPAEDIC SURGERY

## 2022-08-09 PROCEDURE — 3078F PR MOST RECENT DIASTOLIC BLOOD PRESSURE < 80 MM HG: ICD-10-PCS | Mod: CPTII,S$GLB,, | Performed by: ORTHOPAEDIC SURGERY

## 2022-08-09 PROCEDURE — 3288F FALL RISK ASSESSMENT DOCD: CPT | Mod: CPTII,S$GLB,, | Performed by: ORTHOPAEDIC SURGERY

## 2022-08-09 PROCEDURE — 3078F DIAST BP <80 MM HG: CPT | Mod: CPTII,S$GLB,, | Performed by: ORTHOPAEDIC SURGERY

## 2022-08-09 PROCEDURE — 99999 PR PBB SHADOW E&M-EST. PATIENT-LVL V: CPT | Mod: PBBFAC,,, | Performed by: ORTHOPAEDIC SURGERY

## 2022-08-09 PROCEDURE — 71045 XR CHEST 1 VIEW PRE-OP: ICD-10-PCS | Mod: 26,,, | Performed by: RADIOLOGY

## 2022-08-09 PROCEDURE — 1125F PR PAIN SEVERITY QUANTIFIED, PAIN PRESENT: ICD-10-PCS | Mod: CPTII,S$GLB,, | Performed by: ORTHOPAEDIC SURGERY

## 2022-08-09 PROCEDURE — 1101F PR PT FALLS ASSESS DOC 0-1 FALLS W/OUT INJ PAST YR: ICD-10-PCS | Mod: CPTII,S$GLB,, | Performed by: ORTHOPAEDIC SURGERY

## 2022-08-09 PROCEDURE — 3288F PR FALLS RISK ASSESSMENT DOCUMENTED: ICD-10-PCS | Mod: CPTII,S$GLB,, | Performed by: ORTHOPAEDIC SURGERY

## 2022-08-09 PROCEDURE — 99215 OFFICE O/P EST HI 40 MIN: CPT | Mod: S$GLB,,, | Performed by: ORTHOPAEDIC SURGERY

## 2022-08-09 PROCEDURE — 3077F PR MOST RECENT SYSTOLIC BLOOD PRESSURE >= 140 MM HG: ICD-10-PCS | Mod: CPTII,S$GLB,, | Performed by: ORTHOPAEDIC SURGERY

## 2022-08-09 PROCEDURE — 1159F PR MEDICATION LIST DOCUMENTED IN MEDICAL RECORD: ICD-10-PCS | Mod: CPTII,S$GLB,, | Performed by: ORTHOPAEDIC SURGERY

## 2022-08-09 PROCEDURE — 1101F PT FALLS ASSESS-DOCD LE1/YR: CPT | Mod: CPTII,S$GLB,, | Performed by: ORTHOPAEDIC SURGERY

## 2022-08-09 PROCEDURE — 71045 X-RAY EXAM CHEST 1 VIEW: CPT | Mod: TC,FY

## 2022-08-09 PROCEDURE — 71045 X-RAY EXAM CHEST 1 VIEW: CPT | Mod: 26,,, | Performed by: RADIOLOGY

## 2022-08-09 PROCEDURE — 3077F SYST BP >= 140 MM HG: CPT | Mod: CPTII,S$GLB,, | Performed by: ORTHOPAEDIC SURGERY

## 2022-08-09 PROCEDURE — 1159F MED LIST DOCD IN RCRD: CPT | Mod: CPTII,S$GLB,, | Performed by: ORTHOPAEDIC SURGERY

## 2022-08-09 NOTE — PROGRESS NOTES
Subjective:      Patient ID: Diane Reid is a 76 y.o. female.    Chief Complaint: Pain of the Right Knee    Mri shows men tear and oa right knee  C/o cont pain        Social History     Occupational History    Not on file   Tobacco Use    Smoking status: Former Smoker     Years: 20.00     Quit date: 2006     Years since quittin.6    Smokeless tobacco: Never Used   Substance and Sexual Activity    Alcohol use: Yes     Comment: occasional    Drug use: No    Sexual activity: Not on file      ROS      Objective:    Ortho/SPM Exam       Assessment:       1. Acute pain of right knee          Plan:       The patient presents today for followup. MRI revealed a tear of the meniscus as well as some degenerative arthritis of the Right knee. We discussed these findings with  the patient. We did discuss arthroscopy and the fact that its role would hopefully  help the symptoms related to meniscal tear; however, would not necessarily address  any symptoms related to degenerative arthritis. The patient was also offered a course of PT first but would rather move forward with arthroscopy. The patient understands this and would like to move forward. I think that is reasonable. We reviewed risks and benefits of surgery. We also reviewed the role of physical therapy vs arthroscopy. Research indicates that approx 9 mos of PT will achieve similar results to the near immediate improvement with arthroscopy. The patient would like the more quick improvement compared to the delayed improvement with PT. We will go ahead and schedule  this at a time that is convenient for the patient.

## 2022-08-10 ENCOUNTER — CLINICAL SUPPORT (OUTPATIENT)
Dept: REHABILITATION | Facility: HOSPITAL | Age: 77
End: 2022-08-10
Payer: MEDICARE

## 2022-08-10 DIAGNOSIS — R29.898 DECREASED STRENGTH OF LOWER EXTREMITY: Primary | ICD-10-CM

## 2022-08-10 DIAGNOSIS — M25.661 DECREASED RANGE OF MOTION (ROM) OF RIGHT KNEE: ICD-10-CM

## 2022-08-10 DIAGNOSIS — R26.9 GAIT ABNORMALITY: ICD-10-CM

## 2022-08-10 PROCEDURE — 97110 THERAPEUTIC EXERCISES: CPT | Mod: PO

## 2022-08-10 PROCEDURE — 97140 MANUAL THERAPY 1/> REGIONS: CPT | Mod: PO

## 2022-08-10 PROCEDURE — 97112 NEUROMUSCULAR REEDUCATION: CPT | Mod: PO

## 2022-08-10 NOTE — PROGRESS NOTES
Physical Therapy Treatment Note     Name: Diane Reid  Clinic Number: [id number]    Therapy Diagnosis:   Encounter Diagnoses   Name Primary?    Decreased strength of lower extremity Yes    Decreased range of motion (ROM) of right knee     Gait abnormality      Physician: Jamie Tucker MD    Visit Date: 8/10/2022    Physician Orders: PT Eval and Treat   Medical Diagnosis from Referral:   M25.561 (ICD-10-CM) - Acute pain of right knee   M62.81 (ICD-10-CM) - Quadriceps weakness      Evaluation Date: 7/13/2022  Authorization Period Expiration: 8/10/2022  Plan of Care Expiration: 9/9/2022  Progress Note Due: 9/6/2022  Most Recent: 8/8/2022  Visit # / Visits authorized: 7/11     1st FOTO Follow up:   2nd FOTO Follow up:     Time In: 7:07 am  Time Out: 8:00 am  Total Billable Time: 53 minutes    Precautions: Standard    Subjective     Pt reports: She is having surgery for her meniscus on the 29th and then will be completing therapy following that. Her knee is doing ok, her hip is still bothering her but she continues to push through.   She was compliant with home exercise program.  Response to previous treatment: no change  Functional change: ongoing    Pain: 4/10  Location: bilateral knee and L groin    Objective       Diane received therapeutic exercises to develop strength, endurance and ROM for 27 minutes including:    Recumbent bike x6 min level 2   Hooklying bridges 2x10 rep with 3 sec holds   Sidelying clamshells 2x10 reps with GreenTB   Long Arc Quads 3x10 with 3#   Shuttle Double Leg Squats 3x10 reps with 75#  Shuttle Single Leg Squats 2x10 reps with 50#    Not Today:  Short Arc Quads 2x10 reps with 2#   Lateral band walks 2x10 feet each with OrangeTB   Straight Leg Raises 2x10 reps   NuStep x5 minutes level 2     Diane received the following manual therapy techniques: Joint mobilizations were applied to the: knee and hip for 14 minutes, including:     Patellar mobilizations   Tibial internal rotation  "mobilizations   Seated edge of mat distraction with anterior to posterior mobilizations   Long axis hip distraction grade III    Not Today:  Lateral hip distraction grade III    Diane participated in neuromuscular re-education activities to improve: Balance, Coordination and Kinesthetic for 12 minutes. The following activities were included:    Semi tandem stance on blue foam pad 2x30 sec with ball toss from PT  Paloff Press with BlueTB standing on blue foam 2x10 reps each   Step ups on 8-inch with opposite march to challenge stability 2x10 reps     Not Today:   SL stance on the floor, 3 x 10" each LE  Sit to stand hip hinge pattern 2x10 reps with GreenTB around knees     Diane participated in dynamic functional therapeutic activities to improve functional performance for 00 minutes, including:      Home Exercises Provided and Patient Education Provided     Education provided:   - PT POC, prognosis, HEP     Written Home Exercises Provided: Patient instructed to cont prior HEP.  Exercises were reviewed and Diane was able to demonstrate them prior to the end of the session.  Diane demonstrated good  understanding of the education provided.     See EMR under Patient Instructions for exercises provided 7/13/2022.    Assessment     Diane continues with complaints of right knee and left hip pain. She presents stating she will be undergoing knee surgery on the 29th of this month and her doctor wants her to continue with therapy prior to surgery and then again after surgery. Continued use of manual therapy intervention to help improve her mobility with good tolerance. She was able to be challenged with further balance/stability work with no adverse effects. Continued emphasis on hip/glute as well as quad strengthening. Will continue to monitor and progress as able.     Diane Is progressing well towards her goals.   Pt prognosis is Good.     Pt will continue to benefit from skilled outpatient physical therapy to address the " deficits listed in the problem list box on initial evaluation, provide pt/family education and to maximize pt's level of independence in the home and community environment.     Pt's spiritual, cultural and educational needs considered and pt agreeable to plan of care and goals.     Anticipated barriers to physical therapy: scheduling, age, chronicity of symptoms    Goals:   Short Term Goals: 4 weeks (progrssing)  1. Patient will be independent in initial HEP to help supplement PT. - MET   2. Patient will report being able to sleep through the night without pain waking her up to help improve quality of life. - MET   3. Patient will improve right knee range of motion to symmetrical to left to help improve gait mechanics.      Long Term Goals: 8 weeks (progressing)  1. Patient will be independent in updated HEP to help maintain gains made in PT.  2. Patient will improve FOTO Limitation Score to </= 36% to show improvement in condition.   3. Patient will improve hip abduction MMT to >/= 4-/5 to help with gait mechanics.   4. Patient will improve knee MMT to >/=4+/5 to help with stair negotiation.   5. Patient will improve single leg balance to 10 sec to help decrease fall risk.     Plan   Plan of care Certification: 7/13/2022 to 9/9/2022.    Continue to progress knee mobility, as well as quad and glute strength     Jaclyn Cloud, PT

## 2022-08-16 ENCOUNTER — CLINICAL SUPPORT (OUTPATIENT)
Dept: REHABILITATION | Facility: HOSPITAL | Age: 77
End: 2022-08-16
Payer: MEDICARE

## 2022-08-16 DIAGNOSIS — R26.9 GAIT ABNORMALITY: ICD-10-CM

## 2022-08-16 DIAGNOSIS — R29.898 DECREASED STRENGTH OF LOWER EXTREMITY: Primary | ICD-10-CM

## 2022-08-16 DIAGNOSIS — M25.661 DECREASED RANGE OF MOTION (ROM) OF RIGHT KNEE: ICD-10-CM

## 2022-08-16 PROCEDURE — 97110 THERAPEUTIC EXERCISES: CPT | Mod: PO

## 2022-08-16 PROCEDURE — 97140 MANUAL THERAPY 1/> REGIONS: CPT | Mod: PO

## 2022-08-16 NOTE — PROGRESS NOTES
Physical Therapy Treatment Note     Name: Diane Reid  Clinic Number: [id number]    Therapy Diagnosis:   Encounter Diagnoses   Name Primary?    Decreased strength of lower extremity Yes    Decreased range of motion (ROM) of right knee     Gait abnormality      Physician: Jamie Tucker MD    Visit Date: 8/16/2022    Physician Orders: PT Eval and Treat   Medical Diagnosis from Referral:   M25.561 (ICD-10-CM) - Acute pain of right knee   M62.81 (ICD-10-CM) - Quadriceps weakness      Evaluation Date: 7/13/2022  Authorization Period Expiration: 8/10/2022  Plan of Care Expiration: 9/9/2022  Progress Note Due: 9/6/2022  Most Recent: 8/8/2022  Visit # / Visits authorized: 8/11     1st FOTO Follow up:   2nd FOTO Follow up:     Time In: 7:01 am  Time Out: 7:48 am  Total Billable Time: 47 minutes (billable = 24 minutes)     Precautions: Standard    Subjective     Pt reports:  Her knee is bothering her a little more today as well as her hip. She is scheduled for surgery soon and is just working to keep her strength and motion to help her after surgery as well. Her left hip groin area is really bothering her but she is managing it. She has a lot going on today that she has to do so she has a lot on her mind.   She was compliant with home exercise program.  Response to previous treatment: no change  Functional change: ongoing    Pain: 4/10  Location: bilateral knee and L groin    Objective     *Pt one-on-one with PT for 24 minutes*    Diane received therapeutic exercises to develop strength, endurance and ROM for 34 minutes including:    NuStep level 2, x8 minutes   Hooklying bridges 2x10 rep with 3 sec holds with GreenTB around knees   Sidelying clamshells 2x10 reps with GreenTB   Straight Leg Raises 2x10 reps   Short Arc Quads 2x10 reps with 2# and 3 sec holds   Shuttle Double Leg Squats x3 minutes with 50#  Long Arc Quads 3x10 with 2#     Not Today:  Shuttle Single Leg Squats 2x10 reps with 50#  Lateral band walks 2x10  "feet each with OrangeTB   Recumbent bike x6 min level 2     Diane received the following manual therapy techniques: Joint mobilizations were applied to the: knee and hip for 13 minutes, including:     Patellar mobilizations grade II-III  Tibial internal rotation mobilizations grade II-III  Seated edge of mat distraction with anterior to posterior mobilizations   Long axis hip distraction grade III    Not Today:  Lateral hip distraction grade III    Diane participated in neuromuscular re-education activities to improve: Balance, Coordination and Kinesthetic for 00 minutes. The following activities were included:    Not Today:   Semi tandem stance on blue foam pad 2x30 sec with ball toss from PT  Paloff Press with BlueTB standing on blue foam 2x10 reps each   Step ups on 8-inch with opposite march to challenge stability 2x10 reps   SL stance on the floor, 3 x 10" each LE  Sit to stand hip hinge pattern 2x10 reps with GreenTB around knees     Diane participated in dynamic functional therapeutic activities to improve functional performance for 00 minutes, including:      Home Exercises Provided and Patient Education Provided     Education provided:   - PT POC, prognosis, HEP     Written Home Exercises Provided: Patient instructed to cont prior HEP.  Exercises were reviewed and Diane was able to demonstrate them prior to the end of the session.  Diane demonstrated good  understanding of the education provided.     See EMR under Patient Instructions for exercises provided 7/13/2022.    Assessment     Diane presented to today's session with increased hip and knee pain. Manual therapy intervention utilized to help improve pain and joint mobility with good tolerance. Exercises today intensity decreased due to increased pain but able to complete all sets and reps. Continued emphasis on hip strengthening as well as quad strengthening to help prepare for her upcoming surgery. Will continue to monitor and progress as able.  "     Diane Is progressing well towards her goals.   Pt prognosis is Good.     Pt will continue to benefit from skilled outpatient physical therapy to address the deficits listed in the problem list box on initial evaluation, provide pt/family education and to maximize pt's level of independence in the home and community environment.     Pt's spiritual, cultural and educational needs considered and pt agreeable to plan of care and goals.     Anticipated barriers to physical therapy: scheduling, age, chronicity of symptoms    Goals:   Short Term Goals: 4 weeks (progrssing)  1. Patient will be independent in initial HEP to help supplement PT. - MET   2. Patient will report being able to sleep through the night without pain waking her up to help improve quality of life. - MET   3. Patient will improve right knee range of motion to symmetrical to left to help improve gait mechanics.      Long Term Goals: 8 weeks (progressing)  1. Patient will be independent in updated HEP to help maintain gains made in PT.  2. Patient will improve FOTO Limitation Score to </= 36% to show improvement in condition.   3. Patient will improve hip abduction MMT to >/= 4-/5 to help with gait mechanics.   4. Patient will improve knee MMT to >/=4+/5 to help with stair negotiation.   5. Patient will improve single leg balance to 10 sec to help decrease fall risk.     Plan   Plan of care Certification: 7/13/2022 to 9/9/2022.    Continue to progress knee mobility, quad, hip, and glute strengthening, as well as balance/proprioception training.     Jaclyn Cloud, PT

## 2022-08-18 ENCOUNTER — CLINICAL SUPPORT (OUTPATIENT)
Dept: REHABILITATION | Facility: HOSPITAL | Age: 77
End: 2022-08-18
Payer: MEDICARE

## 2022-08-18 DIAGNOSIS — R26.9 GAIT ABNORMALITY: ICD-10-CM

## 2022-08-18 DIAGNOSIS — R29.898 DECREASED STRENGTH OF LOWER EXTREMITY: Primary | ICD-10-CM

## 2022-08-18 DIAGNOSIS — M25.661 DECREASED RANGE OF MOTION (ROM) OF RIGHT KNEE: ICD-10-CM

## 2022-08-18 PROCEDURE — 97112 NEUROMUSCULAR REEDUCATION: CPT | Mod: PO

## 2022-08-18 PROCEDURE — 97110 THERAPEUTIC EXERCISES: CPT | Mod: PO

## 2022-08-18 PROCEDURE — 97140 MANUAL THERAPY 1/> REGIONS: CPT | Mod: PO

## 2022-08-18 NOTE — PROGRESS NOTES
Physical Therapy Treatment Note     Name: Diane Reid  Clinic Number: [id number]    Therapy Diagnosis:   Encounter Diagnoses   Name Primary?    Decreased strength of lower extremity Yes    Decreased range of motion (ROM) of right knee     Gait abnormality      Physician: Jamie Tucker MD    Visit Date: 8/18/2022    Physician Orders: PT Eval and Treat   Medical Diagnosis from Referral:   M25.561 (ICD-10-CM) - Acute pain of right knee   M62.81 (ICD-10-CM) - Quadriceps weakness      Evaluation Date: 7/13/2022  Authorization Period Expiration: 8/10/2022  Plan of Care Expiration: 9/9/2022  Progress Note Due: 9/6/2022  Most Recent: 8/8/2022  Visit # / Visits authorized: 9/11     1st FOTO Follow up:   2nd FOTO Follow up:     Time In: 816am (late arrival)  Time Out: 901am  Total Billable Time: 45 minutes    Precautions: Standard    Subjective     Pt reports: that her L hip his hurting her more than her R knee today. She also hurt her L shoulder last night trying to move her mattress.   She was compliant with home exercise program.  Response to previous treatment: no change  Functional change: ongoing    Pain: 4/10  Location: bilateral knee and L groin    Objective       Diane received therapeutic exercises to develop strength, endurance and ROM for 25 minutes including:    Hooklying bridges 2x10 rep with 3 sec holds with GreenTB around knees   Sidelying clamshells 2x10 reps with GreenTB   Short Arc Quads 2x10 reps with 2# and 3 sec holds   Shuttle Double Leg Squats x3 minutes with 50#  Long Arc Quads 3x10 with 2#   Lateral band walks 2x10 feet each with OrangeTB     Not Today:  NuStep level 2, x8 minutes  Straight Leg Raises 2x10 reps  Shuttle Single Leg Squats 2x10 reps with 50#    Diane received the following manual therapy techniques: Joint mobilizations were applied to the: knee and hip for 10 minutes, including:     Patellar mobilizations grade II-III  Tibial internal rotation mobilizations grade II-III  Seated  "edge of mat distraction with anterior to posterior mobilizations   Long axis hip distraction grade III    Not Today:  Lateral hip distraction grade III    Diane participated in neuromuscular re-education activities to improve: Balance, Coordination and Kinesthetic for 10 minutes. The following activities were included:    Semi tandem stance on blue foam pad 2x30 sec with ball toss from PT  SL stance on the floor, 3 x 10" each LE  Sit to stand at 20" chair, 2x10 reps with GreenTB around knees     Not Today:   Paloff Press with BlueTB standing on blue foam 2x10 reps each   Step ups on 8-inch with opposite march to challenge stability 2x10 reps     Diane participated in dynamic functional therapeutic activities to improve functional performance for 00 minutes, including:      Home Exercises Provided and Patient Education Provided     Education provided:   - PT POC, prognosis, HEP     Written Home Exercises Provided: Patient instructed to cont prior HEP.  Exercises were reviewed and Diane was able to demonstrate them prior to the end of the session.  Diane demonstrated good  understanding of the education provided.     See EMR under Patient Instructions for exercises provided 7/13/2022.    Assessment     Diane remains with R knee and L hip pain at the onset of treatment. She is scheduled to undergo a knee operation on 8/29 and was scheduled for her post operative physical therapy evaluation today. Prior to her surgery, treatment will continue to focus on maximizing her knee ROM and hip/quad strength to prepare her for post operative recovery.     Diane Is progressing well towards her goals.   Pt prognosis is Good.     Pt will continue to benefit from skilled outpatient physical therapy to address the deficits listed in the problem list box on initial evaluation, provide pt/family education and to maximize pt's level of independence in the home and community environment.     Pt's spiritual, cultural and educational needs " considered and pt agreeable to plan of care and goals.     Anticipated barriers to physical therapy: scheduling, age, chronicity of symptoms    Goals:   Short Term Goals: 4 weeks (progrssing)  1. Patient will be independent in initial HEP to help supplement PT. - MET   2. Patient will report being able to sleep through the night without pain waking her up to help improve quality of life. - MET   3. Patient will improve right knee range of motion to symmetrical to left to help improve gait mechanics.      Long Term Goals: 8 weeks (progressing)  1. Patient will be independent in updated HEP to help maintain gains made in PT.  2. Patient will improve FOTO Limitation Score to </= 36% to show improvement in condition.   3. Patient will improve hip abduction MMT to >/= 4-/5 to help with gait mechanics.   4. Patient will improve knee MMT to >/=4+/5 to help with stair negotiation.   5. Patient will improve single leg balance to 10 sec to help decrease fall risk.     Plan   Plan of care Certification: 7/13/2022 to 9/9/2022.    Continue to progress knee mobility, quad, hip, and glute strengthening, as well as balance/proprioception training.     GILES SMITH, PT

## 2022-08-23 ENCOUNTER — CLINICAL SUPPORT (OUTPATIENT)
Dept: REHABILITATION | Facility: HOSPITAL | Age: 77
End: 2022-08-23
Attending: ORTHOPAEDIC SURGERY
Payer: MEDICARE

## 2022-08-23 DIAGNOSIS — M25.661 DECREASED RANGE OF MOTION (ROM) OF RIGHT KNEE: ICD-10-CM

## 2022-08-23 DIAGNOSIS — M25.561 ACUTE PAIN OF RIGHT KNEE: ICD-10-CM

## 2022-08-23 DIAGNOSIS — R29.898 DECREASED STRENGTH OF LOWER EXTREMITY: Primary | ICD-10-CM

## 2022-08-23 DIAGNOSIS — R26.9 GAIT ABNORMALITY: ICD-10-CM

## 2022-08-23 PROCEDURE — 97112 NEUROMUSCULAR REEDUCATION: CPT | Mod: PO

## 2022-08-23 PROCEDURE — 97110 THERAPEUTIC EXERCISES: CPT | Mod: PO

## 2022-08-23 PROCEDURE — 97140 MANUAL THERAPY 1/> REGIONS: CPT | Mod: PO

## 2022-08-23 NOTE — PROGRESS NOTES
Physical Therapy Treatment Note     Name: Diane Reid  Clinic Number: [id number]    Therapy Diagnosis:   Encounter Diagnoses   Name Primary?    Acute pain of right knee     Decreased strength of lower extremity Yes    Decreased range of motion (ROM) of right knee     Gait abnormality      Physician: Jamie Tucker MD    Visit Date: 8/23/2022    Physician Orders: PT Eval and Treat   Medical Diagnosis from Referral:   M25.561 (ICD-10-CM) - Acute pain of right knee   M62.81 (ICD-10-CM) - Quadriceps weakness      Evaluation Date: 7/13/2022  Authorization Period Expiration: 8/10/2022  Plan of Care Expiration: 9/9/2022  Progress Note Due: 9/6/2022  Most Recent: 8/8/2022  Visit # / Visits authorized: 10/11     1st FOTO Follow up:   2nd FOTO Follow up:     Time In: 758am  Time Out: 852am  Total Billable Time: 54 minutes    Precautions: Standard    Subjective     Pt reports: that her L hip is still bothering her a pretty good bit. She has her R knee scope next Monday.   She was compliant with home exercise program.  Response to previous treatment: no change  Functional change: ongoing    Pain: 4/10  Location: bilateral knee and L groin    Objective       Diane received therapeutic exercises to develop strength, endurance and ROM for 30 minutes including:    Hooklying bridges 2x10 rep with 3 sec holds with GreenTB around knees   Sidelying clamshells 2x10 reps with GreenTB   Short Arc Quads 2x10 reps with 2# and 3 sec holds   Shuttle Double Leg Squats x4 minutes with 50#  Long Arc Quads 3x10 with 2#   Lateral band walks 2x10 feet each with OrangeTB   Straight Leg Raises 2x10 reps each side    Not Today:  NuStep level 2, x8 minutes  Shuttle Single Leg Squats 2x10 reps with 50#    Diane received the following manual therapy techniques: Joint mobilizations were applied to the: knee and hip for 12 minutes, including:     Patellar mobilizations grade II-III  Seated edge of mat distraction with anterior to posterior  "mobilizations   Long axis hip distraction grade III  Lateral hip mobs, grade III  Medial joint ling gapping mobs with oscillations, grade III    Not Today:  Lateral hip distraction grade III    Diane participated in neuromuscular re-education activities to improve: Balance, Coordination and Kinesthetic for 12 minutes. The following activities were included:    Step ups on 4" step, 2 x 10 each side  Semi tandem stance on blue foam pad 2x30 sec with ball toss from PT  SL stance on the floor, 3 x 10" each LE  Sit to stand at 20" chair, 3x10 reps with GreenTB around knees     Not Today:   Paloff Press with BlueTB standing on blue foam 2x10 reps each   Step ups on 8-inch with opposite march to challenge stability 2x10 reps     Diane participated in dynamic functional therapeutic activities to improve functional performance for 00 minutes, including:      Home Exercises Provided and Patient Education Provided     Education provided:   - PT POC, prognosis, HEP     Written Home Exercises Provided: Patient instructed to cont prior HEP.  Exercises were reviewed and Diane was able to demonstrate them prior to the end of the session.  Diane demonstrated good  understanding of the education provided.     See EMR under Patient Instructions for exercises provided 7/13/2022.    Assessment     Diane continues to complain of R knee and L hip pain that is relatively unchanged from last week. Manual therapy today focused more on the L hip than the R knee with good pain relief in the short term. Quad and hip strengthening exercises continue to help prepare her for her upcoming knee surgery.     Diane Is progressing well towards her goals.   Pt prognosis is Good.     Pt will continue to benefit from skilled outpatient physical therapy to address the deficits listed in the problem list box on initial evaluation, provide pt/family education and to maximize pt's level of independence in the home and community environment.     Pt's " spiritual, cultural and educational needs considered and pt agreeable to plan of care and goals.     Anticipated barriers to physical therapy: scheduling, age, chronicity of symptoms    Goals:   Short Term Goals: 4 weeks (progrssing)  1. Patient will be independent in initial HEP to help supplement PT. - MET   2. Patient will report being able to sleep through the night without pain waking her up to help improve quality of life. - MET   3. Patient will improve right knee range of motion to symmetrical to left to help improve gait mechanics.      Long Term Goals: 8 weeks (progressing)  1. Patient will be independent in updated HEP to help maintain gains made in PT.  2. Patient will improve FOTO Limitation Score to </= 36% to show improvement in condition.   3. Patient will improve hip abduction MMT to >/= 4-/5 to help with gait mechanics.   4. Patient will improve knee MMT to >/=4+/5 to help with stair negotiation.   5. Patient will improve single leg balance to 10 sec to help decrease fall risk.     Plan   Plan of care Certification: 7/13/2022 to 9/9/2022.    Continue to progress knee mobility, quad, hip, and glute strengthening, as well as balance/proprioception training.     GILES SMITH, PT

## 2022-08-24 ENCOUNTER — HOSPITAL ENCOUNTER (OUTPATIENT)
Dept: PREADMISSION TESTING | Facility: HOSPITAL | Age: 77
Discharge: HOME | End: 2022-08-24
Attending: ORTHOPAEDIC SURGERY
Payer: MEDICARE

## 2022-08-24 ENCOUNTER — ANESTHESIA EVENT (OUTPATIENT)
Dept: SURGERY | Facility: HOSPITAL | Age: 77
End: 2022-08-24
Payer: MEDICARE

## 2022-08-24 VITALS
SYSTOLIC BLOOD PRESSURE: 169 MMHG | BODY MASS INDEX: 35.57 KG/M2 | HEIGHT: 60 IN | HEART RATE: 73 BPM | DIASTOLIC BLOOD PRESSURE: 73 MMHG | WEIGHT: 181.19 LBS | RESPIRATION RATE: 16 BRPM | TEMPERATURE: 98 F | OXYGEN SATURATION: 99 %

## 2022-08-24 RX ORDER — SCOLOPAMINE TRANSDERMAL SYSTEM 1 MG/1
1 PATCH, EXTENDED RELEASE TRANSDERMAL
Status: CANCELLED | OUTPATIENT
Start: 2022-08-24

## 2022-08-24 RX ORDER — SODIUM CHLORIDE, SODIUM LACTATE, POTASSIUM CHLORIDE, CALCIUM CHLORIDE 600; 310; 30; 20 MG/100ML; MG/100ML; MG/100ML; MG/100ML
INJECTION, SOLUTION INTRAVENOUS CONTINUOUS
Status: CANCELLED | OUTPATIENT
Start: 2022-08-24

## 2022-08-24 RX ORDER — LIDOCAINE HYDROCHLORIDE 10 MG/ML
1 INJECTION, SOLUTION EPIDURAL; INFILTRATION; INTRACAUDAL; PERINEURAL ONCE
Status: CANCELLED | OUTPATIENT
Start: 2022-08-24 | End: 2022-08-24

## 2022-08-24 NOTE — PRE-PROCEDURE INSTRUCTIONS
arranging a ride home.    Allergies, medical, surgical, family and psychosocial histories reviewed with patient. Periop plan of care reviewed. Preop instructions given, including medications to take and to hold. Hibiclens soap and instructions on use given. Time allotted for questions to be addressed.  Patient verbalized understanding.

## 2022-08-24 NOTE — ANESTHESIA PREPROCEDURE EVALUATION
08/24/2022  Diane Reid is a 77 y.o., female scheduled for ARTHROSCOPY, KNEE (Right Knee) 8/29/22.    Past Medical History:   Diagnosis Date    Arthritis     Asthma     Pinched nerve in neck     bilateral    Pulmonary stenosis      Past Surgical History:   Procedure Laterality Date    CERVICAL SPINE SURGERY      HYSTERECTOMY         Pre-op Assessment    I have reviewed the Patient Summary Reports.     I have reviewed the Nursing Notes. I have reviewed the NPO Status.   I have reviewed the Medications.     Review of Systems  Anesthesia Hx:  No problems with previous Anesthesia Denies Hx of Anesthetic complications  History of prior surgery of interest to airway management or planning: cervical fusion.  Denies Personal Hx of Anesthesia complications.   Social:  Former Smoker, Social Alcohol Use    Cardiovascular:   Exercise tolerance: good Hypertension  Denies Angina. ECG has been reviewed.    Pulmonary:   Asthma mild Denies Shortness of breath.    Renal/:  Renal/ Normal     Hepatic/GI:  Hepatic/GI Normal    Musculoskeletal:   Arthritis     Neurological:  Neurology Normal Denies TIA.  Denies CVA. Denies Seizures.    Endocrine:  Obesity / BMI > 30  Psych:  Psychiatric Normal           Physical Exam  General: Well nourished and Cooperative    Airway:  Mallampati: II   Mouth Opening: Normal  TM Distance: Normal  Tongue: Normal  Neck ROM: Normal ROM    Dental:  Intact    Chest/Lungs:  Clear to auscultation, Normal Respiratory Rate    Heart:  Rate: Normal  Rhythm: Regular Rhythm  Sounds: Normal    CXR 8/9/22  Cardiac silhouette is normal in size. Lungs are symmetrically expanded and show no significant consolidation, large effusion or pneumothorax. There are calcifications of the thoracic aorta. Bones show no acute abnormalities. Postop changes of the cervical spine.    Impression:    No acute  radiographic findings in the chest.    EKG 8/9/22  Sinus bradycardia   Possible Left atrial enlargement   Septal infarct ,age undetermined   Abnormal ECG   When compared with ECG of 07-MAY-2013 10:26,   Septal infarct is now Present   Confirmed by Tree Juarez MD ([id number]) on 8/9/2022 3:48:18 PM       Anesthesia Plan  Type of Anesthesia, risks & benefits discussed:    Anesthesia Type: Gen ETT, MAC, Spinal  Intra-op Monitoring Plan: Standard ASA Monitors  Post Op Pain Control Plan: multimodal analgesia  Induction:  IV  Informed Consent: Informed consent signed with the Patient and all parties understand the risks and agree with anesthesia plan.  All questions answered.   ASA Score: 3  Anesthesia Plan Notes: Anesthesia consent to be signed prior to surgery 8/29/22      Ready For Surgery From Anesthesia Perspective.     .

## 2022-08-24 NOTE — DISCHARGE INSTRUCTIONS
Your surgery is scheduled for 8/29/22.    Please report to [facility] on the 1st Floor at 0930 a.m.    THIS TIME IS SUBJECT TO CHANGE.  YOU WILL RECEIVE A PHONE CALL THE DAY BEFORE SURGERY BY 3:30 PM TO CONFIRM YOUR TIME OF ARRIVAL.  IF YOU HAVE NOT RECEIVED A PHONE CALL BY 3:30 PM THE DAY BEFORE YOUR SURGERY PLEASE CALL [phone]     INSTRUCTIONS IMPORTANT!!!  ¨ Do not eat or drink after 12 midnight-including water, candy, gum, & mints. OK to brush teeth.      ____  Proceed to [facility] on *** for additional testing.        ____  Do not wear makeup, including mascara.  ____  No powder, lotions or creams to surgical area.  ____  Please remove all jewelry, including piercings and leave at home.  ____  No money or valuables needed. Please leave at home.  ____  Please bring any documents given by your doctor.  ____  If going home the same day, arrange for a ride home. You will not be able to             drive if Anesthesia was used.  ____  Children under 18 years require a parent / guardian present the entire time             they are in surgery / recovery.  ____  Wear loose fitting clothing. Allow for dressings, bandages.  ____  Stop Aspirin, Ibuprofen, Motrin, Aleve, Goody's/BC powders, Excedrine and Naproxen (NSAIDS) at least 3-5 days before surgery, unless otherwise instructed by your doctor, or the nurse.   You MAY use Tylenol/acetaminophen until day of surgery.  ____  Wash the surgical area with Hibiclens the night before surgery, and again the             morning of surgery.  Be sure to rinse hibiclens off completely (if instructed by   nurse).  ____  If you take diabetic medication, do not take am of surgery unless instructed by Doctor.  ____  Call MD for temperature above 101 degrees or any other signs of infection such as Urinary (bladder) infection, Upper respiratory infection, skin boils, etc.  ____ Stop taking any Fish Oil supplement or any Vitamins that contain Vitamin E at  least 5 days prior to surgery.  ____ Do Not wear your contact lenses the day of your procedure.  You may wear your glasses.      ____Do not shave surgical site for 3 days prior to surgery.  ____ Practice Good hand washing before, during, and after procedure.      I have read or had read and explained to me, and understand the above information.  Additional comments or instructions:  For additional questions call [phone]      ANESTHESIA SIDE EFFECTS  -For the first 24 hours after surgery:  Do not drive, use heavy equipment, make important decisions, or drink alcohol  -It is normal to feel sleepy for several hours.  Rest until you are more awake.  -Have someone stay with you, if needed.  They can watch for problems and help keep you safe.  -Some possible post anesthesia side effects include: nausea and vomiting, sore throat and hoarseness, sleepiness, and dizziness.        Pre-Op Bathing Instructions    Before surgery, you can play an important role in your own health.    Because skin is not sterile, we need to be sure that your skin is as free of germs as possible. By following the instructions below, you can reduce the number of germs on your skin before surgery.    IMPORTANT: You will need to shower with a special soap called Hibiclens*, available at any pharmacy.  If you are allergic to Chlorhexidine (the antiseptic in Hibiclens), use an antibacterial soap such as Dial Soap for your preoperative shower.  You will shower with Hibiclens both the night before your surgery and the morning of your surgery.  Do not use Hibiclens on the head, face or genitals to avoid injury to those areas.    STEP #1: THE NIGHT BEFORE YOUR SURGERY     Do not shave the area of your body where your surgery will be performed.  Shower and wash your hair and body as usual with your normal soap and shampoo.  Rinse your hair and body thoroughly after you shower to remove all soap residue.  With your hand, apply one packet of Hibiclens soap to  the surgical site.   Wash the site gently for five (5) minutes. Do not scrub your skin too hard.   Do not wash with your regular soap after Hibiclens is used.  Rinse your body thoroughly.  Pat yourself dry with a clean, soft towel.  Do not use lotion, cream, or powder.  Wear clean clothes.    STEP #2: THE MORNING OF YOUR SURGERY     Repeat Step #1.    * Not to be used by people allergic to Chlorhexidine.

## 2022-08-25 ENCOUNTER — CLINICAL SUPPORT (OUTPATIENT)
Dept: REHABILITATION | Facility: HOSPITAL | Age: 77
End: 2022-08-25
Payer: MEDICARE

## 2022-08-25 DIAGNOSIS — R29.898 DECREASED STRENGTH OF LOWER EXTREMITY: Primary | ICD-10-CM

## 2022-08-25 DIAGNOSIS — M25.661 DECREASED RANGE OF MOTION (ROM) OF RIGHT KNEE: ICD-10-CM

## 2022-08-25 DIAGNOSIS — R26.9 GAIT ABNORMALITY: ICD-10-CM

## 2022-08-25 PROCEDURE — 97110 THERAPEUTIC EXERCISES: CPT | Mod: PO

## 2022-08-25 PROCEDURE — 97140 MANUAL THERAPY 1/> REGIONS: CPT | Mod: PO

## 2022-08-25 PROCEDURE — 97112 NEUROMUSCULAR REEDUCATION: CPT | Mod: PO

## 2022-08-25 NOTE — PROGRESS NOTES
Physical Therapy Treatment Note     Name: Diane Reid  Clinic Number: [id number]    Therapy Diagnosis:   Encounter Diagnoses   Name Primary?    Decreased strength of lower extremity Yes    Decreased range of motion (ROM) of right knee     Gait abnormality      Physician: Jamie Tucker MD    Visit Date: 8/25/2022    Physician Orders: PT Eval and Treat   Medical Diagnosis from Referral:   M25.561 (ICD-10-CM) - Acute pain of right knee   M62.81 (ICD-10-CM) - Quadriceps weakness      Evaluation Date: 7/13/2022  Authorization Period Expiration: 8/10/2022  Plan of Care Expiration: 9/9/2022  Progress Note Due: 8/30/2022  Most Recent: 8/8/2022  Visit # / Visits authorized: 11/11     1st FOTO Follow up:   2nd FOTO Follow up:     Time In: 757am  Time Out: 840am  Total Billable Time: 43 minutes    Precautions: Standard    Subjective     Pt reports: that she is not worried about her knee pain because of her operation on Monday but her L hip is still bothering her a lot.   She was compliant with home exercise program.  Response to previous treatment: no change  Functional change: ongoing    Pain: 4/10  Location: bilateral knee and L groin    Objective     Hip (PROM)    Right Left    Flexion 95 degrees  95 degrees    Internal Rotation 15 degrees  10 degrees    External Rotation 40 degrees  40 degrees     *Pt reported pain in left hip with internal rotation      Knee (AROM/PROM)    Right Left    Flexion 125 degrees / 130 degrees  130 degrees 130 degrees    Extension  -2 degrees / 0 degrees   0 degrees / 0 degrees     *Pt reported pain with forced extension on right knee      Lower Extremity Strength (MMT):    Right  Left    Hip Flexion 4/5 4/5   Hip Abduction 3+/5 3/5   Knee Flexion 4/5 4+/5   Knee Extension 4/5 5/5   Dorsiflexion 4+/5 4+/5   Plantarflexion  4+/5 4+/5   *Plantarflexion measured in modified position seated edge of mat       Diane received therapeutic exercises to develop strength, endurance and ROM for 23  "minutes including:    Assessment as above  Hooklying bridges 2x10 rep with 3 sec holds with GreenTB around knees   Sidelying clamshells 2x10 reps with GreenTB   Short Arc Quads 2x10 reps with 2# and 3 sec holds   Shuttle Double Leg Squats x4 minutes with 50#  Long Arc Quads 3x10 with 2#   Lateral band walks 2x10 feet each with OrangeTB   Straight Leg Raises 2x10 reps each side    Not Today:  NuStep level 2, x8 minutes    Diane received the following manual therapy techniques: Joint mobilizations were applied to the: knee and hip for 10 minutes, including:     Patellar mobilizations grade II-III  Seated edge of mat distraction with anterior to posterior mobilizations   Long axis hip distraction grade III  Lateral hip mobs, grade III  Medial joint ling gapping mobs with oscillations, grade III    Not Today:  Lateral hip distraction grade III    Diane participated in neuromuscular re-education activities to improve: Balance, Coordination and Kinesthetic for 10 minutes. The following activities were included:    Semi tandem stance on blue foam pad 2x30 sec with ball toss from PT  SL stance on the floor, 3 x 10" each LE  Sit to stand at 20" chair, 3x10 reps with GreenTB around knees     Not Today:   Step ups on 4" step, 2 x 10 each side  Paloff Press with BlueTB standing on blue foam 2x10 reps each   Step ups on 8-inch with opposite march to challenge stability 2x10 reps     Diane participated in dynamic functional therapeutic activities to improve functional performance for 00 minutes, including:      Home Exercises Provided and Patient Education Provided     Education provided:   - PT POC, prognosis, HEP     Written Home Exercises Provided: Patient instructed to cont prior HEP.  Exercises were reviewed and Diane was able to demonstrate them prior to the end of the session.  Diane demonstrated good  understanding of the education provided.     See EMR under Patient Instructions for exercises provided " 7/13/2022.    Assessment     Diane presents to PT today with continued complaints of L hip pain which she feels is bothering her more than her R knee at this time. Today her her last treatment session before her R knee scope that is being performed on 8/29. She is scheduled for post operative evaluation on 8/30. Her objective measures were updated today to get the most accurate assessment before her operation. Treatment focused on quad and hip strengthening as well as functional activities. She had some trouble with closed chain loading of her R knee and L hip today but overall tolerated everything well.     Diane Is progressing well towards her goals.   Pt prognosis is Good.     Pt will continue to benefit from skilled outpatient physical therapy to address the deficits listed in the problem list box on initial evaluation, provide pt/family education and to maximize pt's level of independence in the home and community environment.     Pt's spiritual, cultural and educational needs considered and pt agreeable to plan of care and goals.     Anticipated barriers to physical therapy: scheduling, age, chronicity of symptoms    Goals:   Short Term Goals: 4 weeks (progrssing)  1. Patient will be independent in initial HEP to help supplement PT. - MET   2. Patient will report being able to sleep through the night without pain waking her up to help improve quality of life. - MET   3. Patient will improve right knee range of motion to symmetrical to left to help improve gait mechanics.      Long Term Goals: 8 weeks (progressing)  1. Patient will be independent in updated HEP to help maintain gains made in PT.  2. Patient will improve FOTO Limitation Score to </= 36% to show improvement in condition.   3. Patient will improve hip abduction MMT to >/= 4-/5 to help with gait mechanics.   4. Patient will improve knee MMT to >/=4+/5 to help with stair negotiation.   5. Patient will improve single leg balance to 10 sec to help  decrease fall risk.     Plan   Plan of care Certification: 7/13/2022 to 9/9/2022.    Continue to progress knee mobility, quad, hip, and glute strengthening, as well as balance/proprioception training.     GILES SMITH, PT

## 2022-08-29 ENCOUNTER — ANESTHESIA (OUTPATIENT)
Dept: SURGERY | Facility: HOSPITAL | Age: 77
End: 2022-08-29
Payer: MEDICARE

## 2022-08-29 ENCOUNTER — HOSPITAL ENCOUNTER (OUTPATIENT)
Facility: HOSPITAL | Age: 77
Discharge: HOME | End: 2022-08-29
Attending: ORTHOPAEDIC SURGERY | Admitting: ORTHOPAEDIC SURGERY
Payer: MEDICARE

## 2022-08-29 VITALS
TEMPERATURE: 98 F | HEART RATE: 63 BPM | BODY MASS INDEX: 31.89 KG/M2 | HEIGHT: 63 IN | RESPIRATION RATE: 19 BRPM | SYSTOLIC BLOOD PRESSURE: 149 MMHG | WEIGHT: 180 LBS | OXYGEN SATURATION: 96 % | DIASTOLIC BLOOD PRESSURE: 73 MMHG

## 2022-08-29 DIAGNOSIS — M25.569 KNEE PAIN: ICD-10-CM

## 2022-08-29 DIAGNOSIS — M25.561 ACUTE PAIN OF RIGHT KNEE: Primary | ICD-10-CM

## 2022-08-29 LAB
APPEARANCE FLD: NORMAL
BODY FLD TYPE: NORMAL
COLOR FLD: NORMAL
LYMPHOCYTES NFR FLD MANUAL: 29 %
MONOS+MACROS NFR FLD MANUAL: 15 %
NEUTROPHILS NFR FLD MANUAL: 56 %
WBC # FLD: 481 /CU MM

## 2022-08-29 PROCEDURE — 36000710: Performed by: ORTHOPAEDIC SURGERY

## 2022-08-29 PROCEDURE — 71000033 HC RECOVERY, INTIAL HOUR: Performed by: ORTHOPAEDIC SURGERY

## 2022-08-29 PROCEDURE — 25000003 PHARM REV CODE 250: Performed by: NURSE PRACTITIONER

## 2022-08-29 PROCEDURE — 89051 BODY FLUID CELL COUNT: CPT | Performed by: ORTHOPAEDIC SURGERY

## 2022-08-29 PROCEDURE — 37000009 HC ANESTHESIA EA ADD 15 MINS: Performed by: ORTHOPAEDIC SURGERY

## 2022-08-29 PROCEDURE — 63600175 PHARM REV CODE 636 W HCPCS: Performed by: ANESTHESIOLOGY

## 2022-08-29 PROCEDURE — 63600175 PHARM REV CODE 636 W HCPCS

## 2022-08-29 PROCEDURE — 36000711: Performed by: ORTHOPAEDIC SURGERY

## 2022-08-29 PROCEDURE — 29880 ARTHRS KNE SRG MNISECTMY M&L: CPT | Mod: RT,,, | Performed by: ORTHOPAEDIC SURGERY

## 2022-08-29 PROCEDURE — 63600175 PHARM REV CODE 636 W HCPCS: Performed by: STUDENT IN AN ORGANIZED HEALTH CARE EDUCATION/TRAINING PROGRAM

## 2022-08-29 PROCEDURE — 63600175 PHARM REV CODE 636 W HCPCS: Performed by: NURSE PRACTITIONER

## 2022-08-29 PROCEDURE — 29880 PR KNEE SCOPE MED/LAT MENISCECTOMY: ICD-10-PCS | Mod: RT,,, | Performed by: ORTHOPAEDIC SURGERY

## 2022-08-29 PROCEDURE — 37000008 HC ANESTHESIA 1ST 15 MINUTES: Performed by: ORTHOPAEDIC SURGERY

## 2022-08-29 PROCEDURE — 71000015 HC POSTOP RECOV 1ST HR: Performed by: ORTHOPAEDIC SURGERY

## 2022-08-29 PROCEDURE — 25000003 PHARM REV CODE 250: Performed by: STUDENT IN AN ORGANIZED HEALTH CARE EDUCATION/TRAINING PROGRAM

## 2022-08-29 PROCEDURE — 63600175 PHARM REV CODE 636 W HCPCS: Performed by: ORTHOPAEDIC SURGERY

## 2022-08-29 PROCEDURE — 71000039 HC RECOVERY, EACH ADD'L HOUR: Performed by: ORTHOPAEDIC SURGERY

## 2022-08-29 PROCEDURE — 71000016 HC POSTOP RECOV ADDL HR: Performed by: ORTHOPAEDIC SURGERY

## 2022-08-29 PROCEDURE — 27201423 OPTIME MED/SURG SUP & DEVICES STERILE SUPPLY: Performed by: ORTHOPAEDIC SURGERY

## 2022-08-29 PROCEDURE — 25000003 PHARM REV CODE 250: Performed by: ORTHOPAEDIC SURGERY

## 2022-08-29 RX ORDER — FENTANYL CITRATE 50 UG/ML
INJECTION, SOLUTION INTRAMUSCULAR; INTRAVENOUS
Status: DISCONTINUED | OUTPATIENT
Start: 2022-08-29 | End: 2022-08-29

## 2022-08-29 RX ORDER — SODIUM CHLORIDE 0.9 % (FLUSH) 0.9 %
10 SYRINGE (ML) INJECTION
Status: DISCONTINUED | OUTPATIENT
Start: 2022-08-29 | End: 2022-08-29 | Stop reason: HOSPADM

## 2022-08-29 RX ORDER — HYDROMORPHONE HYDROCHLORIDE 2 MG/ML
0.2 INJECTION, SOLUTION INTRAMUSCULAR; INTRAVENOUS; SUBCUTANEOUS EVERY 5 MIN PRN
Status: DISCONTINUED | OUTPATIENT
Start: 2022-08-29 | End: 2022-08-29 | Stop reason: HOSPADM

## 2022-08-29 RX ORDER — BUPIVACAINE HYDROCHLORIDE 2.5 MG/ML
INJECTION, SOLUTION INFILTRATION; PERINEURAL
Status: DISCONTINUED | OUTPATIENT
Start: 2022-08-29 | End: 2022-08-29 | Stop reason: HOSPADM

## 2022-08-29 RX ORDER — LIDOCAINE HYDROCHLORIDE 10 MG/ML
1 INJECTION, SOLUTION EPIDURAL; INFILTRATION; INTRACAUDAL; PERINEURAL ONCE
Status: DISCONTINUED | OUTPATIENT
Start: 2022-08-29 | End: 2022-08-29 | Stop reason: HOSPADM

## 2022-08-29 RX ORDER — LIDOCAINE HYDROCHLORIDE 20 MG/ML
INJECTION, SOLUTION EPIDURAL; INFILTRATION; INTRACAUDAL; PERINEURAL
Status: DISCONTINUED | OUTPATIENT
Start: 2022-08-29 | End: 2022-08-29

## 2022-08-29 RX ORDER — PROCHLORPERAZINE EDISYLATE 5 MG/ML
5 INJECTION INTRAMUSCULAR; INTRAVENOUS EVERY 6 HOURS PRN
Status: COMPLETED | OUTPATIENT
Start: 2022-08-29 | End: 2022-08-29

## 2022-08-29 RX ORDER — PROPOFOL 10 MG/ML
VIAL (ML) INTRAVENOUS
Status: DISCONTINUED | OUTPATIENT
Start: 2022-08-29 | End: 2022-08-29

## 2022-08-29 RX ORDER — CLINDAMYCIN PHOSPHATE 900 MG/50ML
900 INJECTION, SOLUTION INTRAVENOUS ONCE
Status: COMPLETED | OUTPATIENT
Start: 2022-08-29 | End: 2022-08-29

## 2022-08-29 RX ORDER — SCOLOPAMINE TRANSDERMAL SYSTEM 1 MG/1
1 PATCH, EXTENDED RELEASE TRANSDERMAL
Status: DISCONTINUED | OUTPATIENT
Start: 2022-08-29 | End: 2022-08-29 | Stop reason: HOSPADM

## 2022-08-29 RX ORDER — BUPIVACAINE HCL/EPINEPHRINE 0.25-.0005
VIAL (ML) INJECTION
Status: DISCONTINUED | OUTPATIENT
Start: 2022-08-29 | End: 2022-08-29 | Stop reason: HOSPADM

## 2022-08-29 RX ORDER — ONDANSETRON 2 MG/ML
4 INJECTION INTRAMUSCULAR; INTRAVENOUS DAILY PRN
Status: DISCONTINUED | OUTPATIENT
Start: 2022-08-29 | End: 2022-08-29 | Stop reason: HOSPADM

## 2022-08-29 RX ORDER — HYDROMORPHONE HYDROCHLORIDE 2 MG/ML
INJECTION, SOLUTION INTRAMUSCULAR; INTRAVENOUS; SUBCUTANEOUS
Status: COMPLETED
Start: 2022-08-29 | End: 2022-08-29

## 2022-08-29 RX ORDER — SODIUM CHLORIDE, SODIUM LACTATE, POTASSIUM CHLORIDE, CALCIUM CHLORIDE 600; 310; 30; 20 MG/100ML; MG/100ML; MG/100ML; MG/100ML
INJECTION, SOLUTION INTRAVENOUS CONTINUOUS
Status: DISCONTINUED | OUTPATIENT
Start: 2022-08-29 | End: 2022-08-29 | Stop reason: HOSPADM

## 2022-08-29 RX ORDER — EPINEPHRINE 1 MG/ML
INJECTION, SOLUTION INTRACARDIAC; INTRAMUSCULAR; INTRAVENOUS; SUBCUTANEOUS
Status: DISCONTINUED | OUTPATIENT
Start: 2022-08-29 | End: 2022-08-29 | Stop reason: HOSPADM

## 2022-08-29 RX ORDER — HYDROCODONE BITARTRATE AND ACETAMINOPHEN 5; 325 MG/1; MG/1
1 TABLET ORAL EVERY 4 HOURS PRN
Status: DISCONTINUED | OUTPATIENT
Start: 2022-08-29 | End: 2022-08-29 | Stop reason: HOSPADM

## 2022-08-29 RX ORDER — HYDROMORPHONE HYDROCHLORIDE 2 MG/ML
0.5 INJECTION, SOLUTION INTRAMUSCULAR; INTRAVENOUS; SUBCUTANEOUS EVERY 5 MIN PRN
Status: DISCONTINUED | OUTPATIENT
Start: 2022-08-29 | End: 2022-08-29 | Stop reason: HOSPADM

## 2022-08-29 RX ADMIN — SODIUM CHLORIDE, SODIUM LACTATE, POTASSIUM CHLORIDE, AND CALCIUM CHLORIDE: .6; .31; .03; .02 INJECTION, SOLUTION INTRAVENOUS at 07:08

## 2022-08-29 RX ADMIN — HYDROMORPHONE HYDROCHLORIDE 0.5 MG: 2 INJECTION INTRAMUSCULAR; INTRAVENOUS; SUBCUTANEOUS at 08:08

## 2022-08-29 RX ADMIN — PROCHLORPERAZINE EDISYLATE 5 MG: 5 INJECTION INTRAMUSCULAR; INTRAVENOUS at 09:08

## 2022-08-29 RX ADMIN — PROCHLORPERAZINE EDISYLATE 5 MG: 5 INJECTION INTRAMUSCULAR; INTRAVENOUS at 08:08

## 2022-08-29 RX ADMIN — CLINDAMYCIN IN 5 PERCENT DEXTROSE 900 MG: 18 INJECTION, SOLUTION INTRAVENOUS at 07:08

## 2022-08-29 RX ADMIN — LIDOCAINE HYDROCHLORIDE 100 MG: 20 INJECTION, SOLUTION EPIDURAL; INFILTRATION; INTRACAUDAL; PERINEURAL at 07:08

## 2022-08-29 RX ADMIN — ONDANSETRON HYDROCHLORIDE 4 MG: 2 INJECTION, SOLUTION INTRAMUSCULAR; INTRAVENOUS at 10:08

## 2022-08-29 RX ADMIN — FENTANYL CITRATE 50 MCG: 50 INJECTION, SOLUTION INTRAMUSCULAR; INTRAVENOUS at 07:08

## 2022-08-29 RX ADMIN — SCOPALAMINE 1 PATCH: 1 PATCH, EXTENDED RELEASE TRANSDERMAL at 06:08

## 2022-08-29 RX ADMIN — PROPOFOL 180 MG: 10 INJECTION, EMULSION INTRAVENOUS at 07:08

## 2022-08-29 NOTE — PLAN OF CARE
PATIENT OOB AND DRESSED, VSS, PAIN CONTROLLED, NAUSEA CONTROLLED, WILL DISCHARGE IN WC ONCE RIDE ARRIVES

## 2022-08-29 NOTE — TRANSFER OF CARE
"Anesthesia Transfer of Care Note    Patient: Diane Reid    Procedure(s) Performed: Procedure(s) (LRB):  ARTHROSCOPY, KNEE (Right)    Patient location: PACU    Anesthesia Type: general    Transport from OR: Transported from OR on 6-10 L/min O2 by face mask with adequate spontaneous ventilation    Post pain: adequate analgesia    Post assessment: no apparent anesthetic complications    Post vital signs: stable    Level of consciousness: awake and alert    Nausea/Vomiting: no nausea/vomiting    Complications: none    Transfer of care protocol was followed      Last vitals:   Visit Vitals  /85   Pulse 72   Temp 36.7 °C (98.1 °F) (Skin)   Resp 18   Ht 5' 3" (1.6 m)   Wt 81.6 kg (180 lb)   SpO2 100%   Breastfeeding No   BMI 31.89 kg/m²     "

## 2022-08-29 NOTE — ANESTHESIA POSTPROCEDURE EVALUATION
Anesthesia Post Evaluation    Patient: Diane Reid    Procedure(s) Performed: Procedure(s) (LRB):  ARTHROSCOPY, KNEE WITH MEDIAL AND LATERAL MENISCUS DEBRIDEMENT (Right)    Final Anesthesia Type: general      Patient location during evaluation: PACU  Patient participation: Yes- Able to Participate  Level of consciousness: awake and alert, oriented and awake  Post-procedure vital signs: reviewed and stable  Pain management: adequate  Airway patency: patent    PONV status at discharge: No PONV  Anesthetic complications: no      Cardiovascular status: blood pressure returned to baseline  Respiratory status: unassisted and room air  Hydration status: euvolemic  Follow-up not needed.          Vitals Value Taken Time   /73 08/29/22 1200   Temp 36.7 °C (98 °F) 08/29/22 1200   Pulse 63 08/29/22 1200   Resp 19 08/29/22 1200   SpO2 96 % 08/29/22 1200         Event Time   Out of Recovery 09:30:00         Pain/Cele Score: Pain Rating Prior to Med Admin: 3 (8/29/2022  9:30 AM)  Pain Rating Post Med Admin: 3 (8/29/2022  9:15 AM)  Cele Score: 10 (8/29/2022 12:00 PM)

## 2022-08-29 NOTE — BRIEF OP NOTE
[facility] ([facility])  Brief Operative Note    Surgery Date: 8/29/2022     Surgeon(s) and Role:     * Jamie Tucker MD - Primary    Assisting Surgeon: None    Pre-op Diagnosis:  Acute pain of right knee [M25.561]    Post-op Diagnosis:  Post-Op Diagnosis Codes:     * Acute pain of right knee [M25.561]    Procedure(s) (LRB):  ARTHROSCOPY, KNEE (Right)    Anesthesia: General/MAC    Operative Findings: see op note    Estimated Blood Loss: * No values recorded between 8/29/2022  7:28 AM and 8/29/2022  8:16 AM *         Specimens:   Specimen (24h ago, onward)      None              Discharge Note    OUTCOME: Patient tolerated treatment/procedure well without complication and is now ready for discharge.    DISPOSITION: Home or Self Care    FINAL DIAGNOSIS:  <principal problem not specified>    FOLLOWUP: In clinic    DISCHARGE INSTRUCTIONS:    Discharge Procedure Orders   Diet general     Call MD for:  temperature >100.4     Call MD for:  persistent nausea and vomiting     Call MD for:  severe uncontrolled pain     Call MD for:  difficulty breathing, headache or visual disturbances     Call MD for:  redness, tenderness, or signs of infection (pain, swelling, redness, odor or green/yellow discharge around incision site)     Call MD for:  hives     Call MD for:  persistent dizziness or light-headedness     Call MD for:  extreme fatigue     Leave dressing on - Keep it clean, dry, and intact until clinic visit

## 2022-08-29 NOTE — ANESTHESIA PROCEDURE NOTES
Intubation    Date/Time: 8/29/2022 7:10 AM  Performed by: Adina Sifuentes MD  Authorized by: Kev Luo MD     Intubation:     Induction:  Intravenous    Intubated:  Postinduction    Mask Ventilation:  Easy mask    Attempts:  1    Attempted By:  Resident anesthesiologist    Method of Intubation:  Fast track LMA    Difficult Airway Encountered?: No      Complications:  None    Airway Device:  Supraglottic airway/LMA    Airway Device Size:  4.0    Style/Cuff Inflation:  Cuffed (inflated to minimal occlusive pressure)    Placement Verified By:  Capnometry    Complicating Factors:  None    Findings Post-Intubation:  BS equal bilateral

## 2022-08-29 NOTE — OP NOTE
Pre op dx:  Right knee meniscus tear    Post op dx:  Right knee medial lateral meniscus tear    Procedure:  Arthroscopic partial right knee medial and lateral meniscectomy    Attending Surgeon: Jamie Tucker MD    Assistants:  Dr. Baldwin and Marta    Complications: none    Estimated bood loss: < 20cc    Implants:  None    Indication:  This 77-year-old female with mechanical knee pain.  MRI revealed tear of the meniscus.  Operative non operative management discussed with the patient.  We discussed the fact that arthroscopy hopefully help her symptoms related to her meniscus tear however may not address any symptoms related to degenerative arthritis.  She understood this and agreed to move forward with arthroscopy.    Findings:  Patient had degenerative tears of the medial and lateral menisci.    Procedure:   Patient is brought to operating room placed supine on the operative table.    General anesthesia was induced without any difficulties.  Right knee was placed in leg singletary foot table flexed.  Posterior aspect left knee was well padded.  Prior to incision proper sent procedures well as antibiotic administration was verified.  Anterolateral and anteromedial portal sites were injected Marcaine.  Eleven blade was used to establish the anterolateral portal was then dilated using a trocar and cannula and camera placed in suprapatellar pouch.  Undersurface patella was visualized which showed grade 3 fraying.  Patella is tracking centrally within trochlear groove and grade 3 changes on the trochlea as well.  Knee was then flexed medial compartment visualized.  Spinal needle was used to establish the anteromedial portal.  This then creating his left blade and dilated using a trocar.  Knee was then placed in valgus position medial compartment probed.  Medial meniscus had degenerative tear of posterior horn which was debrided to a stable rim using a shaver.  Tibial surface tibial plateau and femoral condyle had grade 3  changes.  Knee was then flexed ACL visualized.  This was intact with no tears strep shins.  Knee was then placed in figure 4 position lateral compartment visualized.  Lateral meniscus had some degenerative fraying of the posterior horn and midbody of the meniscus.  This was debrided to a stable rim using a shaver.  Articular surface tibial plateau had grade 3 changes and grade 2 fissuring on the femur.  Can was then placed in suprapatellar pouch all excess fluid evacuated the cannula.  Portal sites were then closed Dermabond Steri-Strips injected Marcaine.  Incisions were then dressed with sterile ABD and Ace wrap.  Patient was then extubated by anesthesia and transferred to recovery room bed in stable condition

## 2022-08-29 NOTE — PLAN OF CARE
Signed out from pacu per Dr Luo. Report called to Her[facility] Rn/ops. Transported to ops via stretcher,sr upx2.

## 2022-08-30 ENCOUNTER — TELEPHONE (OUTPATIENT)
Dept: ORTHOPEDICS | Facility: CLINIC | Age: 77
End: 2022-08-30
Payer: MEDICARE

## 2022-08-30 ENCOUNTER — CLINICAL SUPPORT (OUTPATIENT)
Dept: REHABILITATION | Facility: HOSPITAL | Age: 77
End: 2022-08-30
Payer: MEDICARE

## 2022-08-30 DIAGNOSIS — M25.661 DECREASED RANGE OF MOTION (ROM) OF RIGHT KNEE: ICD-10-CM

## 2022-08-30 DIAGNOSIS — R26.9 GAIT ABNORMALITY: ICD-10-CM

## 2022-08-30 DIAGNOSIS — R29.898 DECREASED STRENGTH OF LOWER EXTREMITY: Primary | ICD-10-CM

## 2022-08-30 PROCEDURE — 97140 MANUAL THERAPY 1/> REGIONS: CPT | Mod: PO

## 2022-08-30 PROCEDURE — 97112 NEUROMUSCULAR REEDUCATION: CPT | Mod: PO

## 2022-08-30 PROCEDURE — 97110 THERAPEUTIC EXERCISES: CPT | Mod: PO

## 2022-08-30 RX ORDER — DICLOFENAC SODIUM 75 MG/1
75 TABLET, DELAYED RELEASE ORAL 2 TIMES DAILY
Qty: 28 TABLET | Refills: 0 | Status: SHIPPED | OUTPATIENT
Start: 2022-08-30 | End: 2022-09-13

## 2022-08-30 NOTE — TELEPHONE ENCOUNTER
----- Message from Norma Bermeo sent at 8/29/2022  5:37 PM CDT -----  .Type:  Needs Medical Advice    Who Called: self   Would the patient rather a call back or a response via [facility]? Call back   Best Call Back Number: [phone]   Additional Information: patient had surgery today and is in a lot of pain and doesnt have any pain medications. Can something please be called in the pharmacy for her.

## 2022-08-30 NOTE — PLAN OF CARE
"  Physical Therapy Re-Assessment/Treatment Note  And Updated Plan of Care     Name: Diane Reid  Clinic Number: [id number]    Therapy Diagnosis:   Encounter Diagnoses   Name Primary?    Decreased strength of lower extremity Yes    Decreased range of motion (ROM) of right knee     Gait abnormality      Physician: Jamie Tucker MD    Visit Date: 8/30/2022    Physician Orders: PT Eval and Treat   Medical Diagnosis from Referral:   M25.561 (ICD-10-CM) - Acute pain of right knee   M62.81 (ICD-10-CM) - Quadriceps weakness      Evaluation Date: 7/13/2022  Authorization Period Expiration: 8/10/2022  Updated Plan of Care Expiration: 10/14/2022  Progress Note Due: 9/30/2022  Most Recent: 8/30/2022  Visit # / Visits authorized: 13/11   FOTO: 1/3    1st FOTO Follow up:   2nd FOTO Follow up:     Date of Surgery: 8/29/2022  Return to MD: 9/6/2022    Time In: 8:01 am  Time Out: 8:55 am  Total Billable Time: 54 minutes    Precautions: Standard    Subjective     Pt reports: She reports surgery went well no major issues since. She still has pain in the posterior aspect of her right knee which she reports is because of her "baker's cyst". She continues with hip pain. She was educated on plans moving forward and post-op status and stated she "knows what I need to do and is going to do what feels good to her" with regards to exercises moving forward.   She was compliant with home exercise program.  Response to previous treatment: no change  Functional change: ongoing    Pain: 4/10  Location: bilateral knee and L groin    Objective     Patient is 1 day S/P Right knee arthroscopy (menisectomy) as of 8/30/2022.     Observation 8/30/2022: Patient ambulates independently into clinic with no assistive device and ACE bandage and gauze donned/wrapped around her right knee. She displays decreased full knee extension at heel contact, decreased knee flexion, decreased willy, and contralateral hip drop.     Incision inspection: stitch visualized " no acute bleeding noted and incision intact. Gauze re-applied and wrapped again upon leaving PT. No major swelling noted or increased warmth/redness.      Edema Measurements:   Right  Left    6 cm below 38 cm  37 cm    Joint line  40 cm 42 cm    10 cm above  46.5 cm  49 cm      Knee Range of Motion:   Right (AROM/PROM) Left (AROM/PROM)    Flexion 88 degrees / 90 degrees 130 degrees / 130 degrees   Extension  -4 degrees / 0 degrees  0 degrees / 0 degrees      Quad contraction: fair     FOTO Limitation Score = 33%    Diane received therapeutic exercises to develop strength, endurance and ROM for 26 minutes including:    Assessment as above  Pt education on PT POC and HEP   Pt education on goals following surgery   Longsitting hamstring stretch with strap 5x15 sec   Supine heel slides x3 min with strap   Long Arc Quads 3x10 reps     Not Today:  NuStep level 2, x8 minutes  Lateral band walks 2x10 feet each with OrangeTB   Straight Leg Raises 2x10 reps each side  Shuttle Double Leg Squats x4 minutes with 50#  Hooklying bridges 2x10 rep with 3 sec holds with GreenTB around knees   Sidelying clamshells 2x10 reps with GreenTB     Diane received the following manual therapy techniques: Joint mobilizations were applied to the: knee and hip for 12 minutes, including:     Patellar mobilizations grade II-III  Gentle knee extension overpressure   Long axis hip distraction grade III    Not Today:  Lateral hip distraction grade III  Medial joint ling gapping mobs with oscillations, grade III  Seated edge of mat distraction with anterior to posterior mobilizations     Diane participated in neuromuscular re-education activities to improve: Balance, Coordination and Kinesthetic for 16 minutes. The following activities were included:    Supine quad sets 2x10 reps with 5 sec holds   Short Arc Quads 2x10 reps with 3 sec holds   - emphasis on full knee extension and good quad contraction   Stepping over raquel working on heel contact and  "full knee extension 1x15 reps   - ambulating following x75 feet working on heel to toe contact     Not Today:   Step ups on 4" step, 2 x 10 each side  Paloff Press with BlueTB standing on blue foam 2x10 reps each   Step ups on 8-inch with opposite march to challenge stability 2x10 reps   Semi tandem stance on blue foam pad 2x30 sec with ball toss from PT  SL stance on the floor, 3 x 10" each LE  Sit to stand at 20" chair, 3x10 reps with GreenTB around knees     Diane participated in dynamic functional therapeutic activities to improve functional performance for 00 minutes, including:      Home Exercises Provided and Patient Education Provided     Education provided:   - PT POC, prognosis, HEP   - Importance of knee extension   - Monitoring swelling and incision  - Not overdoing it at home and proper gait mechanics     Written Home Exercises Provided: yes.  Exercises were reviewed and Diane was able to demonstrate them prior to the end of the session.  Diane demonstrated good  understanding of the education provided.     See EMR under Patient Instructions for exercises provided 8/30/2022.    Assessment     Diane presents to today's therapy session for updated plan of care secondary to change in status as she underwent a right knee arthroscopic menisectomy on 8/29/2022. She presents with decreased knee range of motion, fair quad activation, gait abnormality, and functional limitations with walking/squatting. She would benefit from continued skilled PT intervention to address her deficits following her new post-op status to improve her overall function and mobility. She was educated on goals following surgery monitoring swelling and incision for signs of infection. She was educated on the importance of regaining full knee extension and provided with exercises to continue to work on at home. She tolerated all therex well today with no adverse effects. She struggles with good quad activation and emphasis on improving " throughout therapy session. Goals updated according to post-op status. Will continue to monitor and progress as tolerated.     Diane Is progressing well towards her goals.   Pt prognosis is Good.     Pt will continue to benefit from skilled outpatient physical therapy to address the deficits listed in the problem list box on initial evaluation, provide pt/family education and to maximize pt's level of independence in the home and community environment.     Pt's spiritual, cultural and educational needs considered and pt agreeable to plan of care and goals.     Anticipated barriers to physical therapy: scheduling, age, chronicity of symptoms    Goals:   Short Term Goals: 4 weeks (progrssing)  1. Patient will be independent in initial HEP to help supplement PT. - Ongoing    2. Patient will report being able to sleep through the night without pain waking her up to help improve quality of life. - MET   3. Patient will improve right knee range of motion to symmetrical to left to help improve gait mechanics. - Ongoing      Long Term Goals: 8 weeks (progressing)  1. Patient will be independent in updated HEP to help maintain gains made in PT.  2. Patient will improve FOTO Limitation Score to </= 27% to show improvement in condition. - Ongoing   3. Patient will improve hip abduction MMT to >/= 4-/5 to help with gait mechanics.   4. Patient will improve knee MMT to >/=4+/5 to help with stair negotiation.   5. Patient will improve single leg balance to 10 sec to help decrease fall risk.     Plan   Updated Plan of care Certification: 8/30/2022 to 10/14/2022.    Outpatient physical therapy 1-2 times weekly for 6 weeks to include the following interventions: Gait Training, Electrical Stimulation (NMES), Manual Therapy, Moist Heat/ Ice, Neuromuscular Re-ed, Patient Education, Self Care, Therapeutic Activities and Therapeutic Exercise.     Jaclyn Cloud, PT

## 2022-08-30 NOTE — PROGRESS NOTES
"  Physical Therapy Re-Assessment/Treatment Note  And Updated Plan of Care     Name: Diane Reid  Clinic Number: [id number]    Therapy Diagnosis:   Encounter Diagnoses   Name Primary?    Decreased strength of lower extremity Yes    Decreased range of motion (ROM) of right knee     Gait abnormality      Physician: Jamie Tucker MD    Visit Date: 8/30/2022    Physician Orders: PT Eval and Treat   Medical Diagnosis from Referral:   M25.561 (ICD-10-CM) - Acute pain of right knee   M62.81 (ICD-10-CM) - Quadriceps weakness      Evaluation Date: 7/13/2022  Authorization Period Expiration: 8/10/2022  Updated Plan of Care Expiration: 10/14/2022  Progress Note Due: 9/30/2022  Most Recent: 8/30/2022  Visit # / Visits authorized: 13/11   FOTO: 1/3    1st FOTO Follow up:   2nd FOTO Follow up:     Date of Surgery: 8/29/2022  Return to MD: 9/6/2022    Time In: 8:01 am  Time Out: 8:55 am  Total Billable Time: 54 minutes    Precautions: Standard    Subjective     Pt reports: She reports surgery went well no major issues since. She still has pain in the posterior aspect of her right knee which she reports is because of her "baker's cyst". She continues with hip pain. She was educated on plans moving forward and post-op status and stated she "knows what I need to do and is going to do what feels good to her" with regards to exercises moving forward.   She was compliant with home exercise program.  Response to previous treatment: no change  Functional change: ongoing    Pain: 4/10  Location: bilateral knee and L groin    Objective     Patient is 1 day S/P Right knee arthroscopy (menisectomy) as of 8/30/2022.     Observation 8/30/2022: Patient ambulates independently into clinic with no assistive device and ACE bandage and gauze donned/wrapped around her right knee. She displays decreased full knee extension at heel contact, decreased knee flexion, decreased willy, and contralateral hip drop.     Incision inspection: stitch visualized " no acute bleeding noted and incision intact. Gauze re-applied and wrapped again upon leaving PT. No major swelling noted or increased warmth/redness.      Edema Measurements:   Right  Left    6 cm below 38 cm  37 cm    Joint line  40 cm 42 cm    10 cm above  46.5 cm  49 cm      Knee Range of Motion:   Right (AROM/PROM) Left (AROM/PROM)    Flexion 88 degrees / 90 degrees 130 degrees / 130 degrees   Extension  -4 degrees / 0 degrees  0 degrees / 0 degrees      Quad contraction: fair     FOTO Limitation Score = 33%    Diane received therapeutic exercises to develop strength, endurance and ROM for 26 minutes including:    Assessment as above  Pt education on PT POC and HEP   Pt education on goals following surgery   Longsitting hamstring stretch with strap 5x15 sec   Supine heel slides x3 min with strap   Long Arc Quads 3x10 reps     Not Today:  NuStep level 2, x8 minutes  Lateral band walks 2x10 feet each with OrangeTB   Straight Leg Raises 2x10 reps each side  Shuttle Double Leg Squats x4 minutes with 50#  Hooklying bridges 2x10 rep with 3 sec holds with GreenTB around knees   Sidelying clamshells 2x10 reps with GreenTB     Diane received the following manual therapy techniques: Joint mobilizations were applied to the: knee and hip for 12 minutes, including:     Patellar mobilizations grade II-III  Gentle knee extension overpressure   Long axis hip distraction grade III    Not Today:  Lateral hip distraction grade III  Medial joint ling gapping mobs with oscillations, grade III  Seated edge of mat distraction with anterior to posterior mobilizations     Diane participated in neuromuscular re-education activities to improve: Balance, Coordination and Kinesthetic for 16 minutes. The following activities were included:    Supine quad sets 2x10 reps with 5 sec holds   Short Arc Quads 2x10 reps with 3 sec holds   - emphasis on full knee extension and good quad contraction   Stepping over raquel working on heel contact and  "full knee extension 1x15 reps   - ambulating following x75 feet working on heel to toe contact     Not Today:   Step ups on 4" step, 2 x 10 each side  Paloff Press with BlueTB standing on blue foam 2x10 reps each   Step ups on 8-inch with opposite march to challenge stability 2x10 reps   Semi tandem stance on blue foam pad 2x30 sec with ball toss from PT  SL stance on the floor, 3 x 10" each LE  Sit to stand at 20" chair, 3x10 reps with GreenTB around knees     Diane participated in dynamic functional therapeutic activities to improve functional performance for 00 minutes, including:      Home Exercises Provided and Patient Education Provided     Education provided:   - PT POC, prognosis, HEP   - Importance of knee extension   - Monitoring swelling and incision  - Not overdoing it at home and proper gait mechanics     Written Home Exercises Provided: yes.  Exercises were reviewed and Diane was able to demonstrate them prior to the end of the session.  Diane demonstrated good  understanding of the education provided.     See EMR under Patient Instructions for exercises provided  8/30/2022 .    Assessment     Diane presents to today's therapy session for updated plan of care secondary to change in status as she underwent a right knee arthroscopic menisectomy on 8/29/2022. She presents with decreased knee range of motion, fair quad activation, gait abnormality, and functional limitations with walking/squatting. She would benefit from continued skilled PT intervention to address her deficits following her new post-op status to improve her overall function and mobility. She was educated on goals following surgery monitoring swelling and incision for signs of infection. She was educated on the importance of regaining full knee extension and provided with exercises to continue to work on at home. She tolerated all therex well today with no adverse effects. She struggles with good quad activation and emphasis on improving " throughout therapy session. Goals updated according to post-op status. Will continue to monitor and progress as tolerated.     Diane Is progressing well towards her goals.   Pt prognosis is Good.     Pt will continue to benefit from skilled outpatient physical therapy to address the deficits listed in the problem list box on initial evaluation, provide pt/family education and to maximize pt's level of independence in the home and community environment.     Pt's spiritual, cultural and educational needs considered and pt agreeable to plan of care and goals.     Anticipated barriers to physical therapy: scheduling, age, chronicity of symptoms    Goals:   Short Term Goals: 4 weeks (progrssing)  1. Patient will be independent in initial HEP to help supplement PT. - Ongoing    2. Patient will report being able to sleep through the night without pain waking her up to help improve quality of life. - MET   3. Patient will improve right knee range of motion to symmetrical to left to help improve gait mechanics. - Ongoing      Long Term Goals: 8 weeks (progressing)  1. Patient will be independent in updated HEP to help maintain gains made in PT.  2. Patient will improve FOTO Limitation Score to </= 27% to show improvement in condition. - Ongoing   3. Patient will improve hip abduction MMT to >/= 4-/5 to help with gait mechanics.   4. Patient will improve knee MMT to >/=4+/5 to help with stair negotiation.   5. Patient will improve single leg balance to 10 sec to help decrease fall risk.     Plan   Updated Plan of care Certification: 8/30/2022 to 10/14/2022.    Outpatient physical therapy 1-2 times weekly for 6 weeks to include the following interventions: Gait Training, Electrical Stimulation (NMES), Manual Therapy, Moist Heat/ Ice, Neuromuscular Re-ed, Patient Education, Self Care, Therapeutic Activities and Therapeutic Exercise.     Jaclyn Cloud, PT

## 2022-09-06 ENCOUNTER — OFFICE VISIT (OUTPATIENT)
Dept: ORTHOPEDICS | Facility: CLINIC | Age: 77
End: 2022-09-06
Payer: MEDICARE

## 2022-09-06 VITALS
SYSTOLIC BLOOD PRESSURE: 131 MMHG | HEART RATE: 62 BPM | DIASTOLIC BLOOD PRESSURE: 66 MMHG | HEIGHT: 63 IN | WEIGHT: 181 LBS | BODY MASS INDEX: 32.07 KG/M2 | TEMPERATURE: 99 F | RESPIRATION RATE: 18 BRPM

## 2022-09-06 DIAGNOSIS — Z98.890 STATUS POST ARTHROSCOPY OF KNEE: Primary | ICD-10-CM

## 2022-09-06 PROCEDURE — 1101F PR PT FALLS ASSESS DOC 0-1 FALLS W/OUT INJ PAST YR: ICD-10-PCS | Mod: CPTII,S$GLB,, | Performed by: ORTHOPAEDIC SURGERY

## 2022-09-06 PROCEDURE — 3288F PR FALLS RISK ASSESSMENT DOCUMENTED: ICD-10-PCS | Mod: CPTII,S$GLB,, | Performed by: ORTHOPAEDIC SURGERY

## 2022-09-06 PROCEDURE — 99024 PR POST-OP FOLLOW-UP VISIT: ICD-10-PCS | Mod: S$GLB,,, | Performed by: ORTHOPAEDIC SURGERY

## 2022-09-06 PROCEDURE — 1125F PR PAIN SEVERITY QUANTIFIED, PAIN PRESENT: ICD-10-PCS | Mod: CPTII,S$GLB,, | Performed by: ORTHOPAEDIC SURGERY

## 2022-09-06 PROCEDURE — 3078F DIAST BP <80 MM HG: CPT | Mod: CPTII,S$GLB,, | Performed by: ORTHOPAEDIC SURGERY

## 2022-09-06 PROCEDURE — 1159F MED LIST DOCD IN RCRD: CPT | Mod: CPTII,S$GLB,, | Performed by: ORTHOPAEDIC SURGERY

## 2022-09-06 PROCEDURE — 3288F FALL RISK ASSESSMENT DOCD: CPT | Mod: CPTII,S$GLB,, | Performed by: ORTHOPAEDIC SURGERY

## 2022-09-06 PROCEDURE — 3078F PR MOST RECENT DIASTOLIC BLOOD PRESSURE < 80 MM HG: ICD-10-PCS | Mod: CPTII,S$GLB,, | Performed by: ORTHOPAEDIC SURGERY

## 2022-09-06 PROCEDURE — 1159F PR MEDICATION LIST DOCUMENTED IN MEDICAL RECORD: ICD-10-PCS | Mod: CPTII,S$GLB,, | Performed by: ORTHOPAEDIC SURGERY

## 2022-09-06 PROCEDURE — 99999 PR PBB SHADOW E&M-EST. PATIENT-LVL III: CPT | Mod: PBBFAC,,, | Performed by: ORTHOPAEDIC SURGERY

## 2022-09-06 PROCEDURE — 3075F SYST BP GE 130 - 139MM HG: CPT | Mod: CPTII,S$GLB,, | Performed by: ORTHOPAEDIC SURGERY

## 2022-09-06 PROCEDURE — 99024 POSTOP FOLLOW-UP VISIT: CPT | Mod: S$GLB,,, | Performed by: ORTHOPAEDIC SURGERY

## 2022-09-06 PROCEDURE — 99999 PR PBB SHADOW E&M-EST. PATIENT-LVL III: ICD-10-PCS | Mod: PBBFAC,,, | Performed by: ORTHOPAEDIC SURGERY

## 2022-09-06 PROCEDURE — 3075F PR MOST RECENT SYSTOLIC BLOOD PRESS GE 130-139MM HG: ICD-10-PCS | Mod: CPTII,S$GLB,, | Performed by: ORTHOPAEDIC SURGERY

## 2022-09-06 PROCEDURE — 1101F PT FALLS ASSESS-DOCD LE1/YR: CPT | Mod: CPTII,S$GLB,, | Performed by: ORTHOPAEDIC SURGERY

## 2022-09-06 PROCEDURE — 1125F AMNT PAIN NOTED PAIN PRSNT: CPT | Mod: CPTII,S$GLB,, | Performed by: ORTHOPAEDIC SURGERY

## 2022-09-06 NOTE — PROGRESS NOTES
Subjective:      Patient ID: Diane Reid is a 77 y.o. female.    Chief Complaint: Pain of the Right Knee    patient's knee pain much improved with knee scope  no new complaints  patient pleased with surgical outcome       Social History     Occupational History    Not on file   Tobacco Use    Smoking status: Former     Years: 20.00     Types: Cigarettes     Quit date: 2006     Years since quittin.6    Smokeless tobacco: Never   Substance and Sexual Activity    Alcohol use: Yes     Comment: occasional    Drug use: No    Sexual activity: Not on file      ROS      Objective:    Ortho/SPM Exam   Portal sites healed  Pain free rom        Assessment:       1. Status post arthroscopy of knee          Plan:       Cont PT   F/u 3 mos

## 2022-09-07 NOTE — PROGRESS NOTES
Physical Therapy Treatment Note     Name: Diane Reid  Clinic Number: [id number]    Therapy Diagnosis:   Encounter Diagnoses   Name Primary?    Decreased strength of lower extremity Yes    Decreased range of motion (ROM) of right knee     Gait abnormality        Physician: Jamie Tucker MD    Visit Date: 9/8/2022    Physician Orders: PT Eval and Treat   Medical Diagnosis from Referral:   M25.561 (ICD-10-CM) - Acute pain of right knee   M62.81 (ICD-10-CM) - Quadriceps weakness      Evaluation Date: 7/13/2022  Authorization Period Expiration: 8/10/2022  Updated Plan of Care Expiration: 10/14/2022  Progress Note Due: 9/30/2022  Most Recent: 8/30/2022  Visit # / Visits authorized: 13/11   FOTO: 1/3    1st FOTO Follow up:   2nd FOTO Follow up:     Date of Surgery: 8/29/2022  Return to MD: 9/6/2022    Time In: 800am  Time Out: 850am  Total Billable Time: 30  minutes    Precautions: Standard    Subjective     Pt reports: that her knee is still sore from her surgery but is getting better. She would rather work on her L hip than her R knee and stated that she was told she no longer has to do PT for her knee.   She was compliant with home exercise program.  Response to previous treatment: no change  Functional change: ongoing    Pain: 4/10  Location: bilateral knee and L groin    Objective     **PT was 1 on 1 with the patient for 30 minutes**    Knee Range of Motion:   Right (AROM/PROM) Left (AROM/PROM)    Flexion 105 degrees / 110 degrees 130 degrees / 130 degrees   Extension  -2 degrees / 0 degrees  0 degrees / 0 degrees        Diane received therapeutic exercises to develop strength, endurance and ROM for 30 minutes including:    Assessment as above  Straight Leg Raises 2x10 reps each side  Shuttle Double Leg Squats x4 minutes with 50#  Hooklying bridges 2x10 rep with 3 sec holds with GreenTB around knees   Sidelying clamshells 2x10 reps with GreenTB   Longsitting hamstring stretch with strap 5x15 sec   Supine heel  "slides x3 min with strap   Long Arc Quads 3x10 reps     Not Today:  NuStep level 2, x8 minutes  Lateral band walks 2x10 feet each with OrangeTB       Diane received the following manual therapy techniques: Joint mobilizations were applied to the: knee and hip for 10 minutes, including:     Patellar mobilizations grade II-III  Gentle knee extension overpressure   Long axis hip distraction grade III    Not Today:  Lateral hip distraction grade III  Medial joint ling gapping mobs with oscillations, grade III  Seated edge of mat distraction with anterior to posterior mobilizations     Diane participated in neuromuscular re-education activities to improve: Balance, Coordination and Kinesthetic for 10  minutes. The following activities were included:    Supine quad sets 2x10 reps with 5 sec holds   Short Arc Quads 2x10 reps with 3 sec holds   - emphasis on full knee extension and good quad contraction   Stepping over raquel working on heel contact and full knee extension 1x15 reps   - ambulating following x75 feet working on heel to toe contact     Not Today:   Step ups on 4" step, 2 x 10 each side  Paloff Press with BlueTB standing on blue foam 2x10 reps each   Step ups on 8-inch with opposite march to challenge stability 2x10 reps   Semi tandem stance on blue foam pad 2x30 sec with ball toss from PT  SL stance on the floor, 3 x 10" each LE  Sit to stand at 20" chair, 3x10 reps with GreenTB around knees     Diane participated in dynamic functional therapeutic activities to improve functional performance for 00 minutes, including:      Home Exercises Provided and Patient Education Provided     Education provided:   - PT POC, prognosis, HEP   - Importance of knee extension   - Monitoring swelling and incision  - Not overdoing it at home and proper gait mechanics     Written Home Exercises Provided: yes.  Exercises were reviewed and Diane was able to demonstrate them prior to the end of the session.  Diane demonstrated good "  understanding of the education provided.     See EMR under Patient Instructions for exercises provided  8/30/2022 .    Assessment     Diane presents PT today with some complaints of pain in her R knee that is expected at this time following her recent R sided meniscectomy. She was able to achieve full knee extension passively and after a few minutes of manual interventions. Her knee flexion is also progressing nicely at this time. She does still have limitations in her quad control and hip strength. She continues to be educated on the importance of post operative PT at this time to restore her knee ROM and LE strength.      Diane Is progressing well towards her goals.   Pt prognosis is Good.     Pt will continue to benefit from skilled outpatient physical therapy to address the deficits listed in the problem list box on initial evaluation, provide pt/family education and to maximize pt's level of independence in the home and community environment.     Pt's spiritual, cultural and educational needs considered and pt agreeable to plan of care and goals.     Anticipated barriers to physical therapy: scheduling, age, chronicity of symptoms    Goals:   Short Term Goals: 4 weeks (progrssing)  1. Patient will be independent in initial HEP to help supplement PT. - Ongoing    2. Patient will report being able to sleep through the night without pain waking her up to help improve quality of life. - MET   3. Patient will improve right knee range of motion to symmetrical to left to help improve gait mechanics. - Ongoing      Long Term Goals: 8 weeks (progressing)  1. Patient will be independent in updated HEP to help maintain gains made in PT.  2. Patient will improve FOTO Limitation Score to </= 27% to show improvement in condition. - Ongoing   3. Patient will improve hip abduction MMT to >/= 4-/5 to help with gait mechanics.   4. Patient will improve knee MMT to >/=4+/5 to help with stair negotiation.   5. Patient will  improve single leg balance to 10 sec to help decrease fall risk.     Plan   Updated Plan of care Certification: 8/30/2022 to 10/14/2022.    Outpatient physical therapy 1-2 times weekly for 6 weeks to include the following interventions: Gait Training, Electrical Stimulation (NMES), Manual Therapy, Moist Heat/ Ice, Neuromuscular Re-ed, Patient Education, Self Care, Therapeutic Activities and Therapeutic Exercise.     GILES SMITH, PT

## 2022-09-08 ENCOUNTER — CLINICAL SUPPORT (OUTPATIENT)
Dept: REHABILITATION | Facility: HOSPITAL | Age: 77
End: 2022-09-08
Payer: MEDICARE

## 2022-09-08 DIAGNOSIS — M25.661 DECREASED RANGE OF MOTION (ROM) OF RIGHT KNEE: ICD-10-CM

## 2022-09-08 DIAGNOSIS — R29.898 DECREASED STRENGTH OF LOWER EXTREMITY: Primary | ICD-10-CM

## 2022-09-08 DIAGNOSIS — R26.9 GAIT ABNORMALITY: ICD-10-CM

## 2022-09-08 PROCEDURE — 97110 THERAPEUTIC EXERCISES: CPT | Mod: PO

## 2022-09-08 PROCEDURE — 97140 MANUAL THERAPY 1/> REGIONS: CPT | Mod: PO

## 2022-09-14 ENCOUNTER — CLINICAL SUPPORT (OUTPATIENT)
Dept: REHABILITATION | Facility: HOSPITAL | Age: 77
End: 2022-09-14
Payer: MEDICARE

## 2022-09-14 DIAGNOSIS — R29.898 DECREASED STRENGTH OF LOWER EXTREMITY: Primary | ICD-10-CM

## 2022-09-14 DIAGNOSIS — M25.661 DECREASED RANGE OF MOTION (ROM) OF RIGHT KNEE: ICD-10-CM

## 2022-09-14 DIAGNOSIS — R26.9 GAIT ABNORMALITY: ICD-10-CM

## 2022-09-14 PROCEDURE — 97110 THERAPEUTIC EXERCISES: CPT | Mod: PO

## 2022-09-14 PROCEDURE — 97140 MANUAL THERAPY 1/> REGIONS: CPT | Mod: PO

## 2022-09-14 NOTE — PROGRESS NOTES
Physical Therapy Discharge Treatment Note     Name: Diane Reid  Clinic Number: [id number]    Therapy Diagnosis:   Encounter Diagnoses   Name Primary?    Decreased strength of lower extremity Yes    Decreased range of motion (ROM) of right knee     Gait abnormality      Physician: Jamie Tucker MD    Visit Date: 9/14/2022  Physician Orders: PT Eval and Treat   Medical Diagnosis from Referral:   M25.561 (ICD-10-CM) - Acute pain of right knee   M62.81 (ICD-10-CM) - Quadriceps weakness      Evaluation Date: 7/13/2022  Authorization Period Expiration: 8/10/2022  Updated Plan of Care Expiration: 10/14/2022  Progress Note Due: 9/30/2022  Most Recent: 8/30/2022  Visit # / Visits authorized: 13/11   FOTO: 1/3    1st FOTO Follow up:   2nd FOTO Follow up:     Date of Surgery: 8/29/2022  Return to MD: 9/6/2022    Time In: 7:56 am  Time Out: 8:20 am  Total Billable Time: 24 minutes    Precautions: Standard    Subjective     Pt reports: That she feels her knee is much better and that it just needs time to heal on its own. She is still having pain in her left hip and feels that is the bigger problem. She states her doctor told her she did not need to continue therapy for her knee and that she would like to be discharged today from physical therapy. She reports she has someone coming to her house to fix something between 8:30 and 9:00 and she needs to leave to be home for that.    She was compliant with home exercise program.  Response to previous treatment: fine   Functional change: ongoing    Pain: 4/10  Location: bilateral knee and L groin    Objective     **PT was 1 on 1 with the patient for 24 minutes**    Observation 9/14/2022: Patient ambulates into the clinic independently with no assistance. She ambulates with slightly flexed knee and decreased step length with slight lean to the left during stance on the left.     Knee Range of Motion:   Right (AROM/PROM) Left (AROM/PROM)    Flexion 115 degrees / 120 degrees 130  "degrees / 130 degrees   Extension  -2 degrees / 0 degrees  0 degrees / 0 degrees      Edema Measurements:   Right  Left    Joint Line 42 cm  40 cm      Lower Extremity Strength (MMT):   Right  Left    Hip Flexion 4-/5 4-/5    Hip Abduction 3+/5 3/5   Knee Flexion 4-/5 4/5   Knee Extension 4-/5 4/5   Dorsiflexion  4+/5 4+/5        Diane received therapeutic exercises to develop strength, endurance and ROM for 16 minutes including:    Assessment as above  Pt education on importance of knee extension, monitoring swelling, and HEP   Long Arc Quads 3x10 reps   Longsitting hamstring stretch with strap 5x15 sec   Quad set 1x10 reps with 5 sec holds     Not Today:  NuStep level 2, x8 minutes  Lateral band walks 2x10 feet each with OrangeTB   Supine heel slides x3 min with strap   Shuttle Double Leg Squats x4 minutes with 50#  Hooklying bridges 2x10 rep with 3 sec holds with GreenTB around knees   Sidelying clamshells 2x10 reps with GreenTB   Straight Leg Raises 2x10 reps each side    Diane received the following manual therapy techniques: Joint mobilizations were applied to the: knee and hip for 8 minutes, including:     Patellar mobilizations grade II-III  Gentle knee extension overpressure     Not Today:  Lateral hip distraction grade III  Medial joint ling gapping mobs with oscillations, grade III  Seated edge of mat distraction with anterior to posterior mobilizations     Diane participated in neuromuscular re-education activities to improve: Balance, Coordination and Kinesthetic for 00 minutes. The following activities were included:    Supine quad sets 2x10 reps with 5 sec holds   Short Arc Quads 2x10 reps with 3 sec holds   - emphasis on full knee extension and good quad contraction   Stepping over raquel working on heel contact and full knee extension 1x15 reps   - ambulating following x75 feet working on heel to toe contact     Not Today:   Step ups on 4" step, 2 x 10 each side  Paloff Press with BlueTB standing on " "blue foam 2x10 reps each   Step ups on 8-inch with opposite march to challenge stability 2x10 reps   Semi tandem stance on blue foam pad 2x30 sec with ball toss from PT  SL stance on the floor, 3 x 10" each LE  Sit to stand at 20" chair, 3x10 reps with GreenTB around knees     Diane participated in dynamic functional therapeutic activities to improve functional performance for 00 minutes, including:      Home Exercises Provided and Patient Education Provided     Education provided:   - PT POC, prognosis, HEP   - Importance of knee extension   - Monitoring swelling and incision  - Not overdoing it at home and proper gait mechanics     Written Home Exercises Provided: yes.  Exercises were reviewed and Diane was able to demonstrate them prior to the end of the session.  Diane demonstrated good  understanding of the education provided.     See EMR under Patient Instructions for exercises provided  8/30/2022 .    Assessment     Diane has been seen for a total of 14 visits with this being her third visit following her arthroscopic surgery on her right knee. She presents today requesting to be discharged as she feels her knee is feeling better and it "just needs time to heal" on its own. She reports her left hip is the bigger issue and wants to get a referral to address her hip. She was educated on the importance of therapy after surgery and that she still has some swelling as well as lacking mobility that she would benefit from working on. She verbalized understanding and states she knows what to do as she had this surgery on the other extremity years ago. She was educated on continuing to work on exercises at home with emphasis on her knee extension and improving her quad strength. She was provided with an updated home exercise program which she verbalized understanding of all exercises. At this time patient to be discharged from formal physical therapy to continue to work on her home exercise program at home.     Diane " Is progressing well towards her goals.   Pt prognosis is Good.     Pt will continue to benefit from skilled outpatient physical therapy to address the deficits listed in the problem list box on initial evaluation, provide pt/family education and to maximize pt's level of independence in the home and community environment.     Pt's spiritual, cultural and educational needs considered and pt agreeable to plan of care and goals.     Anticipated barriers to physical therapy: scheduling, age, chronicity of symptoms    Goals:   Short Term Goals: 4 weeks   1. Patient will be independent in initial HEP to help supplement PT. - MET  2. Patient will report being able to sleep through the night without pain waking her up to help improve quality of life. - MET   3. Patient will improve right knee range of motion to symmetrical to left to help improve gait mechanics. - Not Met      Long Term Goals: 8 weeks   1. Patient will be independent in updated HEP to help maintain gains made in PT. - Not Met   2. Patient will improve FOTO Limitation Score to </= 27% to show improvement in condition. - Not Met   3. Patient will improve hip abduction MMT to >/= 4-/5 to help with gait mechanics. - Not Met   4. Patient will improve knee MMT to >/=4+/5 to help with stair negotiation. - Not Met   5. Patient will improve single leg balance to 10 sec to help decrease fall risk. - Not Met     Plan   Updated Plan of care Certification: 8/30/2022 to 10/14/2022.    Patient to be discharged at this time from formal physical therapy per patient request.     Jaclyn Cloud, PT

## 2022-09-17 NOTE — PLAN OF CARE
Physical Therapy Discharge Treatment Note     Name: Diane Reid  Clinic Number: [id number]    Therapy Diagnosis:   Encounter Diagnoses   Name Primary?    Decreased strength of lower extremity Yes    Decreased range of motion (ROM) of right knee     Gait abnormality      Physician: Jamie Tucker MD    Visit Date: 9/14/2022  Physician Orders: PT Eval and Treat   Medical Diagnosis from Referral:   M25.561 (ICD-10-CM) - Acute pain of right knee   M62.81 (ICD-10-CM) - Quadriceps weakness      Evaluation Date: 7/13/2022  Authorization Period Expiration: 8/10/2022  Updated Plan of Care Expiration: 10/14/2022  Progress Note Due: 9/30/2022  Most Recent: 8/30/2022  Visit # / Visits authorized: 13/11   FOTO: 1/3    1st FOTO Follow up:   2nd FOTO Follow up:     Date of Surgery: 8/29/2022  Return to MD: 9/6/2022    Time In: 7:56 am  Time Out: 8:20 am  Total Billable Time: 24 minutes    Precautions: Standard    Subjective     Pt reports: That she feels her knee is much better and that it just needs time to heal on its own. She is still having pain in her left hip and feels that is the bigger problem. She states her doctor told her she did not need to continue therapy for her knee and that she would like to be discharged today from physical therapy. She reports she has someone coming to her house to fix something between 8:30 and 9:00 and she needs to leave to be home for that.    She was compliant with home exercise program.  Response to previous treatment: fine   Functional change: ongoing    Pain: 4/10  Location: bilateral knee and L groin    Objective     **PT was 1 on 1 with the patient for 24 minutes**    Observation 9/14/2022: Patient ambulates into the clinic independently with no assistance. She ambulates with slightly flexed knee and decreased step length with slight lean to the left during stance on the left.     Knee Range of Motion:   Right (AROM/PROM) Left (AROM/PROM)    Flexion 115 degrees / 120 degrees 130  "degrees / 130 degrees   Extension  -2 degrees / 0 degrees  0 degrees / 0 degrees      Edema Measurements:   Right  Left    Joint Line 42 cm  40 cm      Lower Extremity Strength (MMT):   Right  Left    Hip Flexion 4-/5 4-/5    Hip Abduction 3+/5 3/5   Knee Flexion 4-/5 4/5   Knee Extension 4-/5 4/5   Dorsiflexion  4+/5 4+/5        Diane received therapeutic exercises to develop strength, endurance and ROM for 16 minutes including:    Assessment as above  Pt education on importance of knee extension, monitoring swelling, and HEP   Long Arc Quads 3x10 reps   Longsitting hamstring stretch with strap 5x15 sec   Quad set 1x10 reps with 5 sec holds     Not Today:  NuStep level 2, x8 minutes  Lateral band walks 2x10 feet each with OrangeTB   Supine heel slides x3 min with strap   Shuttle Double Leg Squats x4 minutes with 50#  Hooklying bridges 2x10 rep with 3 sec holds with GreenTB around knees   Sidelying clamshells 2x10 reps with GreenTB   Straight Leg Raises 2x10 reps each side    Diane received the following manual therapy techniques: Joint mobilizations were applied to the: knee and hip for 8 minutes, including:     Patellar mobilizations grade II-III  Gentle knee extension overpressure     Not Today:  Lateral hip distraction grade III  Medial joint ling gapping mobs with oscillations, grade III  Seated edge of mat distraction with anterior to posterior mobilizations     Diane participated in neuromuscular re-education activities to improve: Balance, Coordination and Kinesthetic for 00 minutes. The following activities were included:    Supine quad sets 2x10 reps with 5 sec holds   Short Arc Quads 2x10 reps with 3 sec holds   - emphasis on full knee extension and good quad contraction   Stepping over raquel working on heel contact and full knee extension 1x15 reps   - ambulating following x75 feet working on heel to toe contact     Not Today:   Step ups on 4" step, 2 x 10 each side  Paloff Press with BlueTB standing on " "blue foam 2x10 reps each   Step ups on 8-inch with opposite march to challenge stability 2x10 reps   Semi tandem stance on blue foam pad 2x30 sec with ball toss from PT  SL stance on the floor, 3 x 10" each LE  Sit to stand at 20" chair, 3x10 reps with GreenTB around knees     Diane participated in dynamic functional therapeutic activities to improve functional performance for 00 minutes, including:      Home Exercises Provided and Patient Education Provided     Education provided:   - PT POC, prognosis, HEP   - Importance of knee extension   - Monitoring swelling and incision  - Not overdoing it at home and proper gait mechanics     Written Home Exercises Provided: yes.  Exercises were reviewed and Diane was able to demonstrate them prior to the end of the session.  Diane demonstrated good  understanding of the education provided.     See EMR under Patient Instructions for exercises provided 8/30/2022.    Assessment     Diane has been seen for a total of 14 visits with this being her third visit following her arthroscopic surgery on her right knee. She presents today requesting to be discharged as she feels her knee is feeling better and it "just needs time to heal" on its own. She reports her left hip is the bigger issue and wants to get a referral to address her hip. She was educated on the importance of therapy after surgery and that she still has some swelling as well as lacking mobility that she would benefit from working on. She verbalized understanding and states she knows what to do as she had this surgery on the other extremity years ago. She was educated on continuing to work on exercises at home with emphasis on her knee extension and improving her quad strength. She was provided with an updated home exercise program which she verbalized understanding of all exercises. At this time patient to be discharged from formal physical therapy to continue to work on her home exercise program at home.     Diane Is " progressing well towards her goals.   Pt prognosis is Good.     Pt will continue to benefit from skilled outpatient physical therapy to address the deficits listed in the problem list box on initial evaluation, provide pt/family education and to maximize pt's level of independence in the home and community environment.     Pt's spiritual, cultural and educational needs considered and pt agreeable to plan of care and goals.     Anticipated barriers to physical therapy: scheduling, age, chronicity of symptoms    Goals:   Short Term Goals: 4 weeks   1. Patient will be independent in initial HEP to help supplement PT. - MET  2. Patient will report being able to sleep through the night without pain waking her up to help improve quality of life. - MET   3. Patient will improve right knee range of motion to symmetrical to left to help improve gait mechanics. - Not Met      Long Term Goals: 8 weeks   1. Patient will be independent in updated HEP to help maintain gains made in PT. - Not Met   2. Patient will improve FOTO Limitation Score to </= 27% to show improvement in condition. - Not Met   3. Patient will improve hip abduction MMT to >/= 4-/5 to help with gait mechanics. - Not Met   4. Patient will improve knee MMT to >/=4+/5 to help with stair negotiation. - Not Met   5. Patient will improve single leg balance to 10 sec to help decrease fall risk. - Not Met     Plan   Updated Plan of care Certification: 8/30/2022 to 10/14/2022.    Patient to be discharged at this time from formal physical therapy per patient request.     Jaclyn Cloud, PT

## 2022-09-23 ENCOUNTER — IMMUNIZATION (OUTPATIENT)
Dept: PRIMARY CARE CLINIC | Facility: CLINIC | Age: 77
End: 2022-09-23
Payer: MEDICARE

## 2022-09-23 DIAGNOSIS — Z23 NEED FOR VACCINATION: Primary | ICD-10-CM

## 2022-09-23 PROCEDURE — 91312 COVID-19, MRNA, LNP-S, BIVALENT BOOSTER, PF, 30 MCG/0.3 ML DOSE: CPT | Mod: PBBFAC | Performed by: INTERNAL MEDICINE

## 2022-09-23 PROCEDURE — 0124A COVID-19, MRNA, LNP-S, BIVALENT BOOSTER, PF, 30 MCG/0.3 ML DOSE: CPT | Mod: CV19,PBBFAC | Performed by: INTERNAL MEDICINE

## 2022-10-12 ENCOUNTER — TELEPHONE (OUTPATIENT)
Dept: ORTHOPEDICS | Facility: CLINIC | Age: 77
End: 2022-10-12
Payer: MEDICARE

## 2022-10-12 NOTE — TELEPHONE ENCOUNTER
----- Message from Crystal [place], Patient Care Assistant sent at 10/12/2022  9:57 AM CDT -----  Type:  Needs Medical Advice    Who Called:  pt  Symptoms (please be specific):  Patient would like a call back regarding a bill [facility] sent her for service on 09/23   Would the patient rather a call back or a response via [facility]?  Call   Best Call Back Number:  [phone]   Additional Information:

## 2022-12-01 ENCOUNTER — HOSPITAL ENCOUNTER (OUTPATIENT)
Dept: RADIOLOGY | Facility: HOSPITAL | Age: 77
Discharge: HOME | End: 2022-12-01
Attending: ORTHOPAEDIC SURGERY
Payer: MEDICARE

## 2022-12-01 ENCOUNTER — OFFICE VISIT (OUTPATIENT)
Dept: ORTHOPEDICS | Facility: CLINIC | Age: 77
End: 2022-12-01
Payer: MEDICARE

## 2022-12-01 VITALS
BODY MASS INDEX: 31.45 KG/M2 | SYSTOLIC BLOOD PRESSURE: 150 MMHG | HEART RATE: 80 BPM | DIASTOLIC BLOOD PRESSURE: 79 MMHG | HEIGHT: 63 IN | WEIGHT: 177.5 LBS

## 2022-12-01 DIAGNOSIS — M17.11 PRIMARY OSTEOARTHRITIS OF RIGHT KNEE: Primary | ICD-10-CM

## 2022-12-01 DIAGNOSIS — M17.11 PRIMARY OSTEOARTHRITIS OF RIGHT KNEE: ICD-10-CM

## 2022-12-01 DIAGNOSIS — M25.561 CHRONIC PAIN OF RIGHT KNEE: ICD-10-CM

## 2022-12-01 DIAGNOSIS — G89.29 CHRONIC PAIN OF RIGHT KNEE: ICD-10-CM

## 2022-12-01 PROCEDURE — 3077F SYST BP >= 140 MM HG: CPT | Mod: CPTII,S$GLB,, | Performed by: ORTHOPAEDIC SURGERY

## 2022-12-01 PROCEDURE — 73562 XR KNEE 3 VIEW RIGHT: ICD-10-PCS | Mod: 26,RT,, | Performed by: RADIOLOGY

## 2022-12-01 PROCEDURE — 3078F DIAST BP <80 MM HG: CPT | Mod: CPTII,S$GLB,, | Performed by: ORTHOPAEDIC SURGERY

## 2022-12-01 PROCEDURE — 3288F FALL RISK ASSESSMENT DOCD: CPT | Mod: CPTII,S$GLB,, | Performed by: ORTHOPAEDIC SURGERY

## 2022-12-01 PROCEDURE — 99213 OFFICE O/P EST LOW 20 MIN: CPT | Mod: S$GLB,,, | Performed by: ORTHOPAEDIC SURGERY

## 2022-12-01 PROCEDURE — 99213 PR OFFICE/OUTPT VISIT, EST, LEVL III, 20-29 MIN: ICD-10-PCS | Mod: S$GLB,,, | Performed by: ORTHOPAEDIC SURGERY

## 2022-12-01 PROCEDURE — 3077F PR MOST RECENT SYSTOLIC BLOOD PRESSURE >= 140 MM HG: ICD-10-PCS | Mod: CPTII,S$GLB,, | Performed by: ORTHOPAEDIC SURGERY

## 2022-12-01 PROCEDURE — 3078F PR MOST RECENT DIASTOLIC BLOOD PRESSURE < 80 MM HG: ICD-10-PCS | Mod: CPTII,S$GLB,, | Performed by: ORTHOPAEDIC SURGERY

## 2022-12-01 PROCEDURE — 1159F PR MEDICATION LIST DOCUMENTED IN MEDICAL RECORD: ICD-10-PCS | Mod: CPTII,S$GLB,, | Performed by: ORTHOPAEDIC SURGERY

## 2022-12-01 PROCEDURE — 1101F PR PT FALLS ASSESS DOC 0-1 FALLS W/OUT INJ PAST YR: ICD-10-PCS | Mod: CPTII,S$GLB,, | Performed by: ORTHOPAEDIC SURGERY

## 2022-12-01 PROCEDURE — 3288F PR FALLS RISK ASSESSMENT DOCUMENTED: ICD-10-PCS | Mod: CPTII,S$GLB,, | Performed by: ORTHOPAEDIC SURGERY

## 2022-12-01 PROCEDURE — 1159F MED LIST DOCD IN RCRD: CPT | Mod: CPTII,S$GLB,, | Performed by: ORTHOPAEDIC SURGERY

## 2022-12-01 PROCEDURE — 1125F AMNT PAIN NOTED PAIN PRSNT: CPT | Mod: CPTII,S$GLB,, | Performed by: ORTHOPAEDIC SURGERY

## 2022-12-01 PROCEDURE — 73562 X-RAY EXAM OF KNEE 3: CPT | Mod: 26,RT,, | Performed by: RADIOLOGY

## 2022-12-01 PROCEDURE — 99999 PR PBB SHADOW E&M-EST. PATIENT-LVL III: ICD-10-PCS | Mod: PBBFAC,,, | Performed by: ORTHOPAEDIC SURGERY

## 2022-12-01 PROCEDURE — 99999 PR PBB SHADOW E&M-EST. PATIENT-LVL III: CPT | Mod: PBBFAC,,, | Performed by: ORTHOPAEDIC SURGERY

## 2022-12-01 PROCEDURE — 1125F PR PAIN SEVERITY QUANTIFIED, PAIN PRESENT: ICD-10-PCS | Mod: CPTII,S$GLB,, | Performed by: ORTHOPAEDIC SURGERY

## 2022-12-01 PROCEDURE — 1101F PT FALLS ASSESS-DOCD LE1/YR: CPT | Mod: CPTII,S$GLB,, | Performed by: ORTHOPAEDIC SURGERY

## 2022-12-01 PROCEDURE — 73562 X-RAY EXAM OF KNEE 3: CPT | Mod: TC,PN,RT

## 2022-12-01 NOTE — PROGRESS NOTES
Subjective:      Patient ID: Diane Reid is a 77 y.o. female.    Chief Complaint: Pain and Post-op Evaluation of the Right Knee    Knee Pain: right  swelling: yes  worsens with activity: yes  relieved by: nsaid's       Social History     Occupational History    Not on file   Tobacco Use    Smoking status: Former     Years: 20.00     Types: Cigarettes     Quit date: 2006     Years since quittin.9    Smokeless tobacco: Never   Substance and Sexual Activity    Alcohol use: Yes     Comment: occasional    Drug use: No    Sexual activity: Not on file      Review of Systems   Constitutional: Negative for diaphoresis.   HENT:  Negative for ear discharge, nosebleeds and stridor.    Eyes:  Negative for photophobia.   Cardiovascular:  Negative for syncope.   Respiratory:  Negative for hemoptysis, shortness of breath and wheezing.    Neurological:  Negative for tremors.   Psychiatric/Behavioral: Negative.         Objective:    General    Constitutional: She is oriented to person, place, and time. She appears well-developed.   HENT:   Head: Normocephalic and atraumatic.   Right Ear: External ear normal.   Left Ear: External ear normal.   Eyes: EOM are normal.   Cardiovascular:  Intact distal pulses.            Neurological: She is alert and oriented to person, place, and time.   Psychiatric: She has a normal mood and affect. Her behavior is normal. Judgment and thought content normal.     General Musculoskeletal Exam   Gait: antalgic and abnormal       Right Knee Exam     Inspection   Swelling: present  Effusion: present    Tenderness   The patient is tender to palpation of the medial joint line.    Crepitus   The patient has crepitus of the patella.    Range of Motion   Flexion:  abnormal     Other   Sensation: normal    Muscle Strength   Right Lower Extremity   Quadriceps:  4/5   Hamstrin/5        Assessment:       1. Primary osteoarthritis of right knee    2. Chronic pain of right knee          Plan:        Will start HA injections for KL 2 knee oa (mild/mod)  Nsaid's prn for now

## 2022-12-14 ENCOUNTER — TELEPHONE (OUTPATIENT)
Dept: ORTHOPEDICS | Facility: CLINIC | Age: 77
End: 2022-12-14

## 2022-12-14 ENCOUNTER — OFFICE VISIT (OUTPATIENT)
Dept: ORTHOPEDICS | Facility: CLINIC | Age: 77
End: 2022-12-14
Payer: MEDICARE

## 2022-12-14 ENCOUNTER — HOSPITAL ENCOUNTER (OUTPATIENT)
Dept: RADIOLOGY | Facility: HOSPITAL | Age: 77
Discharge: HOME | End: 2022-12-14
Attending: ORTHOPAEDIC SURGERY
Payer: MEDICARE

## 2022-12-14 VITALS
HEART RATE: 72 BPM | DIASTOLIC BLOOD PRESSURE: 81 MMHG | HEIGHT: 63 IN | SYSTOLIC BLOOD PRESSURE: 145 MMHG | WEIGHT: 176.56 LBS | BODY MASS INDEX: 31.29 KG/M2

## 2022-12-14 VITALS
HEIGHT: 63 IN | DIASTOLIC BLOOD PRESSURE: 81 MMHG | WEIGHT: 177.5 LBS | SYSTOLIC BLOOD PRESSURE: 145 MMHG | HEART RATE: 72 BPM | BODY MASS INDEX: 31.45 KG/M2

## 2022-12-14 DIAGNOSIS — R52 PAIN: ICD-10-CM

## 2022-12-14 DIAGNOSIS — M17.11 PRIMARY OSTEOARTHRITIS OF RIGHT KNEE: Primary | ICD-10-CM

## 2022-12-14 DIAGNOSIS — R52 PAIN: Primary | ICD-10-CM

## 2022-12-14 DIAGNOSIS — M16.12 PRIMARY OSTEOARTHRITIS OF LEFT HIP: Primary | ICD-10-CM

## 2022-12-14 PROCEDURE — 3077F SYST BP >= 140 MM HG: CPT | Mod: CPTII,S$GLB,, | Performed by: ORTHOPAEDIC SURGERY

## 2022-12-14 PROCEDURE — 1160F RVW MEDS BY RX/DR IN RCRD: CPT | Mod: CPTII,S$GLB,, | Performed by: PHYSICIAN ASSISTANT

## 2022-12-14 PROCEDURE — 3077F SYST BP >= 140 MM HG: CPT | Mod: CPTII,S$GLB,, | Performed by: PHYSICIAN ASSISTANT

## 2022-12-14 PROCEDURE — 3288F PR FALLS RISK ASSESSMENT DOCUMENTED: ICD-10-PCS | Mod: CPTII,S$GLB,, | Performed by: ORTHOPAEDIC SURGERY

## 2022-12-14 PROCEDURE — 1159F MED LIST DOCD IN RCRD: CPT | Mod: CPTII,S$GLB,, | Performed by: PHYSICIAN ASSISTANT

## 2022-12-14 PROCEDURE — 3077F PR MOST RECENT SYSTOLIC BLOOD PRESSURE >= 140 MM HG: ICD-10-PCS | Mod: CPTII,S$GLB,, | Performed by: ORTHOPAEDIC SURGERY

## 2022-12-14 PROCEDURE — 73502 X-RAY EXAM HIP UNI 2-3 VIEWS: CPT | Mod: TC,PN,LT

## 2022-12-14 PROCEDURE — 1159F PR MEDICATION LIST DOCUMENTED IN MEDICAL RECORD: ICD-10-PCS | Mod: CPTII,S$GLB,, | Performed by: PHYSICIAN ASSISTANT

## 2022-12-14 PROCEDURE — 20610 LARGE JOINT ASPIRATION/INJECTION: R KNEE: ICD-10-PCS | Mod: RT,S$GLB,, | Performed by: PHYSICIAN ASSISTANT

## 2022-12-14 PROCEDURE — 99999 PR PBB SHADOW E&M-EST. PATIENT-LVL III: ICD-10-PCS | Mod: PBBFAC,,, | Performed by: ORTHOPAEDIC SURGERY

## 2022-12-14 PROCEDURE — 99499 UNLISTED E&M SERVICE: CPT | Mod: S$GLB,,, | Performed by: PHYSICIAN ASSISTANT

## 2022-12-14 PROCEDURE — 3079F DIAST BP 80-89 MM HG: CPT | Mod: CPTII,S$GLB,, | Performed by: PHYSICIAN ASSISTANT

## 2022-12-14 PROCEDURE — 73502 X-RAY EXAM HIP UNI 2-3 VIEWS: CPT | Mod: 26,LT,, | Performed by: RADIOLOGY

## 2022-12-14 PROCEDURE — 1126F AMNT PAIN NOTED NONE PRSNT: CPT | Mod: CPTII,S$GLB,, | Performed by: ORTHOPAEDIC SURGERY

## 2022-12-14 PROCEDURE — 3079F DIAST BP 80-89 MM HG: CPT | Mod: CPTII,S$GLB,, | Performed by: ORTHOPAEDIC SURGERY

## 2022-12-14 PROCEDURE — 1126F PR PAIN SEVERITY QUANTIFIED, NO PAIN PRESENT: ICD-10-PCS | Mod: CPTII,S$GLB,, | Performed by: ORTHOPAEDIC SURGERY

## 2022-12-14 PROCEDURE — 3288F FALL RISK ASSESSMENT DOCD: CPT | Mod: CPTII,S$GLB,, | Performed by: PHYSICIAN ASSISTANT

## 2022-12-14 PROCEDURE — 1101F PR PT FALLS ASSESS DOC 0-1 FALLS W/OUT INJ PAST YR: ICD-10-PCS | Mod: CPTII,S$GLB,, | Performed by: ORTHOPAEDIC SURGERY

## 2022-12-14 PROCEDURE — 99499 NO LOS: ICD-10-PCS | Mod: S$GLB,,, | Performed by: PHYSICIAN ASSISTANT

## 2022-12-14 PROCEDURE — 99999 PR PBB SHADOW E&M-EST. PATIENT-LVL III: CPT | Mod: PBBFAC,,, | Performed by: ORTHOPAEDIC SURGERY

## 2022-12-14 PROCEDURE — 20610 DRAIN/INJ JOINT/BURSA W/O US: CPT | Mod: RT,S$GLB,, | Performed by: PHYSICIAN ASSISTANT

## 2022-12-14 PROCEDURE — 1125F PR PAIN SEVERITY QUANTIFIED, PAIN PRESENT: ICD-10-PCS | Mod: CPTII,S$GLB,, | Performed by: PHYSICIAN ASSISTANT

## 2022-12-14 PROCEDURE — 1101F PR PT FALLS ASSESS DOC 0-1 FALLS W/OUT INJ PAST YR: ICD-10-PCS | Mod: CPTII,S$GLB,, | Performed by: PHYSICIAN ASSISTANT

## 2022-12-14 PROCEDURE — 1125F AMNT PAIN NOTED PAIN PRSNT: CPT | Mod: CPTII,S$GLB,, | Performed by: PHYSICIAN ASSISTANT

## 2022-12-14 PROCEDURE — 99999 PR PBB SHADOW E&M-EST. PATIENT-LVL III: CPT | Mod: PBBFAC,,, | Performed by: PHYSICIAN ASSISTANT

## 2022-12-14 PROCEDURE — 3079F PR MOST RECENT DIASTOLIC BLOOD PRESSURE 80-89 MM HG: ICD-10-PCS | Mod: CPTII,S$GLB,, | Performed by: PHYSICIAN ASSISTANT

## 2022-12-14 PROCEDURE — 1101F PT FALLS ASSESS-DOCD LE1/YR: CPT | Mod: CPTII,S$GLB,, | Performed by: ORTHOPAEDIC SURGERY

## 2022-12-14 PROCEDURE — 99213 OFFICE O/P EST LOW 20 MIN: CPT | Mod: S$GLB,,, | Performed by: ORTHOPAEDIC SURGERY

## 2022-12-14 PROCEDURE — 1101F PT FALLS ASSESS-DOCD LE1/YR: CPT | Mod: CPTII,S$GLB,, | Performed by: PHYSICIAN ASSISTANT

## 2022-12-14 PROCEDURE — 99999 PR PBB SHADOW E&M-EST. PATIENT-LVL III: ICD-10-PCS | Mod: PBBFAC,,, | Performed by: PHYSICIAN ASSISTANT

## 2022-12-14 PROCEDURE — 99213 PR OFFICE/OUTPT VISIT, EST, LEVL III, 20-29 MIN: ICD-10-PCS | Mod: S$GLB,,, | Performed by: ORTHOPAEDIC SURGERY

## 2022-12-14 PROCEDURE — 1160F PR REVIEW ALL MEDS BY PRESCRIBER/CLIN PHARMACIST DOCUMENTED: ICD-10-PCS | Mod: CPTII,S$GLB,, | Performed by: PHYSICIAN ASSISTANT

## 2022-12-14 PROCEDURE — 73502 XR HIP WITH PELVIS WHEN PERFORMED, 2 OR 3 VIEWS LEFT: ICD-10-PCS | Mod: 26,LT,, | Performed by: RADIOLOGY

## 2022-12-14 PROCEDURE — 3077F PR MOST RECENT SYSTOLIC BLOOD PRESSURE >= 140 MM HG: ICD-10-PCS | Mod: CPTII,S$GLB,, | Performed by: PHYSICIAN ASSISTANT

## 2022-12-14 PROCEDURE — 3288F PR FALLS RISK ASSESSMENT DOCUMENTED: ICD-10-PCS | Mod: CPTII,S$GLB,, | Performed by: PHYSICIAN ASSISTANT

## 2022-12-14 PROCEDURE — 3288F FALL RISK ASSESSMENT DOCD: CPT | Mod: CPTII,S$GLB,, | Performed by: ORTHOPAEDIC SURGERY

## 2022-12-14 PROCEDURE — 3079F PR MOST RECENT DIASTOLIC BLOOD PRESSURE 80-89 MM HG: ICD-10-PCS | Mod: CPTII,S$GLB,, | Performed by: ORTHOPAEDIC SURGERY

## 2022-12-14 RX ORDER — LOSARTAN POTASSIUM 25 MG/1
25 TABLET ORAL
COMMUNITY
Start: 2022-11-30

## 2022-12-14 RX ORDER — MULTIVIT WITH IRON,MINERALS
1 TABLET ORAL
COMMUNITY

## 2022-12-14 RX ORDER — ESTRADIOL 0.5 MG/1
1 TABLET ORAL DAILY
COMMUNITY
Start: 2022-11-22

## 2022-12-14 RX ORDER — OFLOXACIN 3 MG/ML
1 SOLUTION/ DROPS OPHTHALMIC 4 TIMES DAILY
COMMUNITY

## 2022-12-14 RX ORDER — CHOLECALCIFEROL (VITAMIN D3) 25 MCG
5000 TABLET ORAL
COMMUNITY

## 2022-12-14 NOTE — PROGRESS NOTES
[facility], Orthopedics and Sports Medicine  [facility]    New Patient Office Visit  12/14/2022     Subjective:      Diane Reid is a 77 y.o. female referred by No ref. provider found for evaluation and treatment of left hip pain.  This is evaluated as a personal injury.   The patient has the following symptoms: pain located left groin .  The symptoms began several months ago and are unchanged.    She has seen another provider in my practice for injections of the knee problem and continues with that treatment.    Outside reports reviewed: historical medical records and office notes.    Past Medical History:   Diagnosis Date    Arthritis     Asthma     Pinched nerve in neck     bilateral    Pulmonary stenosis        Patient Active Problem List   Diagnosis    Primary localized osteoarthrosis, lower leg    Facet arthritis of lumbar region    Chronic pain of right knee    Quadriceps weakness    Primary osteoarthritis of left knee    Primary osteoarthritis of right knee    Decreased strength of lower extremity    Decreased range of motion (ROM) of right knee    Gait abnormality    Status post arthroscopy of knee    Primary osteoarthritis of left hip       Past Surgical History:   Procedure Laterality Date    ARTHROSCOPY OF KNEE Right 8/29/2022    Procedure: ARTHROSCOPY, KNEE WITH MEDIAL AND LATERAL MENISCUS DEBRIDEMENT;  Surgeon: Jamie Tucker MD;  Location: [facility] OR;  Service: Orthopedics;  Laterality: Right;  video confirmed CW 8/26    CERVICAL SPINE SURGERY      HYSTERECTOMY      KNEE ARTHROSCOPY W/ MENISCECTOMY  8/29/2022    Procedure: ARTHROSCOPY, KNEE, WITH MENISCECTOMY;  Surgeon: Jamie Tucker MD;  Location: [facility] OR;  Service: Orthopedics;;        Current Outpatient Medications   Medication Instructions    calcium cit-mag-D3-Zn--babita 866--3.75 mg-mg-unit-mg Tab 1 tablet, Oral    COMBIGAN 0.2-0.5 % Drop 1 drop, Both Eyes, 2 times daily    DUREZOL 0.05 % Drop ophthalmic solution 1  DROP IN LEFT EYE IN THE MORNING    estradioL (ESTRACE) 0.5 MG tablet 1 tablet, Oral, Daily    estrogens (conjugated) (PREMARIN) 1.25 mg, Daily    losartan (COZAAR) 25 mg, Oral    multivitamin capsule 1 capsule, Oral, Daily    ofloxacin (OCUFLOX) 0.3 % ophthalmic solution 1 drop, Ophthalmic, 4 times daily    oxycodone-acetaminophen 5-325 mg (PERCOCET) 5-325 mg per tablet 1 tablet, Oral, Every 4 hours PRN    potassium gluconate 2.5 mEq Tab 1 tablet, Oral    timolol maleate 0.5% (TIMOPTIC) 0.5 % Drop No dose, route, or frequency recorded.    TRAVATAN Z 0.004 % Drop 1 drop, Both Eyes, Nightly    vitamin D (VITAMIN D3) 5,000 Units, Oral        Review of patient's allergies indicates:   Allergen Reactions    Penicillins      Pt cant remember allergy type         Social History     Socioeconomic History    Marital status: Single   Tobacco Use    Smoking status: Former     Years: 20.00     Types: Cigarettes     Quit date: 2006     Years since quittin.9    Smokeless tobacco: Never   Substance and Sexual Activity    Alcohol use: Yes     Comment: occasional    Drug use: No       No family history on file.      Review of Systems   Constitutional: Negative for chills and fever.   HENT:  Negative for hearing loss.    Eyes:  Negative for blurred vision.   Cardiovascular:  Negative for chest pain.   Respiratory:  Negative for shortness of breath.    Gastrointestinal:  Negative for abdominal pain.   Neurological:  Negative for light-headedness.        Objective:      General    Nursing note and vitals reviewed.  Constitutional: She is oriented to person, place, and time. She appears well-developed and well-nourished.   HENT:   Head: Normocephalic and atraumatic.   Eyes: Pupils are equal, round, and reactive to light.   Cardiovascular:  Normal rate and regular rhythm.            Pulmonary/Chest: Effort normal.   Abdominal: Soft.   Neurological: She is oriented to person, place, and time.   Psychiatric: She has a normal mood  and affect. Her behavior is normal.     General Musculoskeletal Exam   Gait: normal         Right Hip Exam     Inspection   No deformity of hip.  Quadriceps Atrophy:  Negative    Range of Motion   Extension:  0   Flexion:  100     Tests   Pain w/ forced internal rotation (KEYSHA): absent  Pain w/ forced external rotation (FADIR): absent    Other   Sensation: normal  Left Hip Exam     Inspection   No deformity of hip.  Quadriceps Atrophy:  negative    Range of Motion   Extension:  0   Flexion:  90     Tests   Pain w/ forced internal rotation (KEYSHA): absent  Pain w/ forced external rotation (FADIR): absent  Stinchfield test: positive  Snapping Hip (internal): negative    Other   Sensation: normal          Muscle Strength   Right Lower Extremity   Hip Flexion: 5/5   Ankle Dorsiflexion:  5/5   Left Lower Extremity   Hip Flexion: 5/5   Ankle Dorsiflexion:  5/5     Vascular Exam     Right Pulses  Dorsalis Pedis:      2+          Left Pulses  Dorsalis Pedis:      2+          Edema  Right Upper Leg: absent  Left Upper Leg: absent    Imaging:  Radiographs of the left hip taken 12/14/2022 were personally reviewed from the [facility] EMR.  These include a standing AP pelvis, AP hip, and a frog leg lateral of the hip.  The hip demonstrates moderate degenerative changes.  The lumbar spine demonstrates moderate to severe degenerative changes.  No acute fractures or dislocations are noted in these images.     Procedures        Assessment:       Diane Reid is a 77 y.o. female seen in the office today. The encounter diagnosis was Primary osteoarthritis of left hip.  Non-operative treatment is recommended at this time. The patient will benefit from therapy of the hip and tylenol as needed for pain.  Not severe enough to require surgery, but can refer if symptoms worsen. The natural history and expected course discussed with patient. Various treatment options were discussed, including their risks and benefits. All of the  patient's questions were answered.     Plan:      Physical therapy and rehabilitation treatment.  Tylenol 650mg TID, PRN pain.  Follow up as needed if symptoms worsen.          Kevin Potter IV, MD   of Clinical Orthopedics  Department of Orthopedic Surgery  [facility]  Office: [phone]  Website: [email or web address]

## 2022-12-14 NOTE — PROGRESS NOTES
Subjective:      Patient ID: Diane Reid is a 77 y.o. female.    Chief Complaint: Injections of the Right Knee      Patient is here for First Durolane injection(s) for right knee osteoarthritis. Patient tolerated last injection well. No new complaints today.         Review of Systems   Constitutional: Negative for chills and fever.   Cardiovascular:  Negative for chest pain.   Respiratory:  Negative for cough.    Hematologic/Lymphatic: Does not bruise/bleed easily.   Skin:  Negative for poor wound healing and rash.   Musculoskeletal:  Positive for arthritis, joint pain, myalgias and stiffness.   Gastrointestinal:  Negative for abdominal pain.   Genitourinary:  Negative for bladder incontinence.   Neurological:  Negative for dizziness, loss of balance and weakness.   Psychiatric/Behavioral:  Negative for altered mental status.      Review of patient's allergies indicates:   Allergen Reactions    Penicillins      Pt cant remember allergy type          Current Outpatient Medications   Medication Sig Dispense Refill    calcium cit-mag-D3-Zn--babita 634--3.75 mg-mg-unit-mg Tab Take 1 tablet by mouth.      COMBIGAN 0.2-0.5 % Drop Place 1 drop into both eyes 2 (two) times daily.  3    DUREZOL 0.05 % Drop ophthalmic solution 1 DROP IN LEFT EYE IN THE MORNING  0    estradioL (ESTRACE) 0.5 MG tablet Take 1 tablet by mouth once daily.      estrogens, conjugated, (PREMARIN) 1.25 MG tablet Take 1.25 mg by mouth once daily.      losartan (COZAAR) 25 MG tablet Take 25 mg by mouth.      multivitamin capsule Take 1 capsule by mouth once daily.      ofloxacin (OCUFLOX) 0.3 % ophthalmic solution Apply 1 drop to eye 4 (four) times daily.      oxycodone-acetaminophen 5-325 mg (PERCOCET) 5-325 mg per tablet Take 1 tablet by mouth every 4 (four) hours as needed for Pain. 45 tablet 0    potassium gluconate 2.5 mEq Tab Take 1 tablet by mouth.      timolol maleate 0.5% (TIMOPTIC) 0.5 % Drop       TRAVATAN Z 0.004 % Drop Place 1  "drop into both eyes every evening.  0    vitamin D (VITAMIN D3) 1000 units Tab Take 5,000 Units by mouth.       No current facility-administered medications for this visit.        The patient's relevant past medical, surgical, and social history was reviewed in Epic.       Objective:      VITAL SIGNS: BP (!) 145/81   Pulse 72   Ht 5' 3" (1.6 m)   Wt 80.5 kg (177 lb 7.5 oz)   BMI 31.44 kg/m²     Ortho/SPM Exam         Assessment:       1. Primary osteoarthritis of right knee          Plan:         Diane was seen today for injections.    Diagnoses and all orders for this visit:    Primary osteoarthritis of right knee  -     Large Joint Aspiration/Injection: R knee    I injected the right knee with one dose of Durolane today.  We will see the patient back in 6 mos or as needed.     Diagnoses and plan discussed with the patient, as well as the expected course and duration of his symptoms.  All questions and concerns were addressed prior to the end of the visit.   Instructed patient to call office if they have any future questions/concerns or to schedule apt. Patient will return to see me if symptoms worsen or fail to improve    Note dictated with voice recognition software, please excuse any grammatical errors.        Lenora Montoya PA-C   12/14/2022    "

## 2022-12-14 NOTE — PROCEDURES
Large Joint Aspiration/Injection: R knee    Date/Time: 12/14/2022 9:45 AM  Performed by: eLnora Montoya PA-C  Authorized by: Lenora Montoya PA-C     Consent Done?:  Yes (Verbal)  Indications:  Arthritis and pain  Site marked: the procedure site was marked    Timeout: prior to procedure the correct patient, procedure, and site was verified    Prep: patient was prepped and draped in usual sterile fashion    Local anesthetic:  Topical anesthetic    Details:  Needle Size:  21 G  Approach:  Anterolateral  Location:  Knee  Site:  R knee  Medications:  60 mg hyaluronate sodium, stabilized 60 mg/3 mL  Patient tolerance:  Patient tolerated the procedure well with no immediate complications

## 2023-02-03 DIAGNOSIS — M16.12 PRIMARY OSTEOARTHRITIS OF LEFT HIP: Primary | ICD-10-CM

## 2023-02-07 DIAGNOSIS — M16.12 PRIMARY OSTEOARTHRITIS OF LEFT HIP: Primary | ICD-10-CM

## 2023-04-27 ENCOUNTER — OFFICE VISIT (OUTPATIENT)
Dept: ORTHOPEDICS | Facility: CLINIC | Age: 78
End: 2023-04-27
Payer: MEDICARE

## 2023-04-27 VITALS
DIASTOLIC BLOOD PRESSURE: 68 MMHG | WEIGHT: 184.31 LBS | SYSTOLIC BLOOD PRESSURE: 151 MMHG | HEIGHT: 63 IN | BODY MASS INDEX: 32.66 KG/M2 | HEART RATE: 73 BPM

## 2023-04-27 DIAGNOSIS — M16.12 PRIMARY OSTEOARTHRITIS OF LEFT HIP: Primary | ICD-10-CM

## 2023-04-27 PROCEDURE — 3288F FALL RISK ASSESSMENT DOCD: CPT | Mod: CPTII,S$GLB,, | Performed by: ORTHOPAEDIC SURGERY

## 2023-04-27 PROCEDURE — 1159F MED LIST DOCD IN RCRD: CPT | Mod: CPTII,S$GLB,, | Performed by: ORTHOPAEDIC SURGERY

## 2023-04-27 PROCEDURE — 99213 PR OFFICE/OUTPT VISIT, EST, LEVL III, 20-29 MIN: ICD-10-PCS | Mod: S$GLB,,, | Performed by: ORTHOPAEDIC SURGERY

## 2023-04-27 PROCEDURE — 3077F PR MOST RECENT SYSTOLIC BLOOD PRESSURE >= 140 MM HG: ICD-10-PCS | Mod: CPTII,S$GLB,, | Performed by: ORTHOPAEDIC SURGERY

## 2023-04-27 PROCEDURE — 1125F PR PAIN SEVERITY QUANTIFIED, PAIN PRESENT: ICD-10-PCS | Mod: CPTII,S$GLB,, | Performed by: ORTHOPAEDIC SURGERY

## 2023-04-27 PROCEDURE — 99213 OFFICE O/P EST LOW 20 MIN: CPT | Mod: S$GLB,,, | Performed by: ORTHOPAEDIC SURGERY

## 2023-04-27 PROCEDURE — 3077F SYST BP >= 140 MM HG: CPT | Mod: CPTII,S$GLB,, | Performed by: ORTHOPAEDIC SURGERY

## 2023-04-27 PROCEDURE — 99999 PR PBB SHADOW E&M-EST. PATIENT-LVL III: CPT | Mod: PBBFAC,,, | Performed by: ORTHOPAEDIC SURGERY

## 2023-04-27 PROCEDURE — 1101F PR PT FALLS ASSESS DOC 0-1 FALLS W/OUT INJ PAST YR: ICD-10-PCS | Mod: CPTII,S$GLB,, | Performed by: ORTHOPAEDIC SURGERY

## 2023-04-27 PROCEDURE — 99999 PR PBB SHADOW E&M-EST. PATIENT-LVL III: ICD-10-PCS | Mod: PBBFAC,,, | Performed by: ORTHOPAEDIC SURGERY

## 2023-04-27 PROCEDURE — 1159F PR MEDICATION LIST DOCUMENTED IN MEDICAL RECORD: ICD-10-PCS | Mod: CPTII,S$GLB,, | Performed by: ORTHOPAEDIC SURGERY

## 2023-04-27 PROCEDURE — 3288F PR FALLS RISK ASSESSMENT DOCUMENTED: ICD-10-PCS | Mod: CPTII,S$GLB,, | Performed by: ORTHOPAEDIC SURGERY

## 2023-04-27 PROCEDURE — 3078F DIAST BP <80 MM HG: CPT | Mod: CPTII,S$GLB,, | Performed by: ORTHOPAEDIC SURGERY

## 2023-04-27 PROCEDURE — 3078F PR MOST RECENT DIASTOLIC BLOOD PRESSURE < 80 MM HG: ICD-10-PCS | Mod: CPTII,S$GLB,, | Performed by: ORTHOPAEDIC SURGERY

## 2023-04-27 PROCEDURE — 1125F AMNT PAIN NOTED PAIN PRSNT: CPT | Mod: CPTII,S$GLB,, | Performed by: ORTHOPAEDIC SURGERY

## 2023-04-27 PROCEDURE — 1101F PT FALLS ASSESS-DOCD LE1/YR: CPT | Mod: CPTII,S$GLB,, | Performed by: ORTHOPAEDIC SURGERY

## 2023-04-27 RX ORDER — LIFITEGRAST 50 MG/ML
SOLUTION/ DROPS OPHTHALMIC
COMMUNITY
Start: 2023-01-31

## 2023-04-27 RX ORDER — IBUPROFEN 800 MG/1
800 TABLET ORAL EVERY 8 HOURS PRN
COMMUNITY
Start: 2023-04-18

## 2023-04-27 RX ORDER — SODIUM FLUORIDE1.1%, POTASSIUM NITRATE 5% 5.8; 57.5 MG/ML; MG/ML
GEL, DENTIFRICE DENTAL
COMMUNITY
Start: 2022-12-28

## 2023-04-27 RX ORDER — ZOLPIDEM TARTRATE 5 MG/1
5 TABLET ORAL NIGHTLY PRN
COMMUNITY
Start: 2023-03-01

## 2023-04-27 RX ORDER — HYDROCHLOROTHIAZIDE 12.5 MG/1
12.5 TABLET ORAL
COMMUNITY
Start: 2023-04-20

## 2023-04-27 NOTE — PROGRESS NOTES
[facility], Orthopedics and Sports Medicine  [facility]    Established Patient Office Visit  04/27/2023     Diagnosis:  Left hip OA    Subjective:      Diane Reid is a 77 y.o. female who presents for follow up treatment of the above mentioned diagnosis.  Overall the patient's symptoms are unchanged.  The patient is currently in physical therapy, which helps some but does not relieve the pain; she has been in therapy for 3 months.  Motrin helps some.      Outside reports reviewed: historical medical records and office notes.    Past Medical History:   Diagnosis Date    Arthritis     Asthma     Pinched nerve in neck     bilateral    Pulmonary stenosis        Patient Active Problem List   Diagnosis    Primary localized osteoarthrosis, lower leg    Facet arthritis of lumbar region    Chronic pain of right knee    Quadriceps weakness    Primary osteoarthritis of left knee    Primary osteoarthritis of right knee    Decreased strength of lower extremity    Decreased range of motion (ROM) of right knee    Gait abnormality    Status post arthroscopy of knee    Primary osteoarthritis of left hip       Past Surgical History:   Procedure Laterality Date    ARTHROSCOPY OF KNEE Right 8/29/2022    Procedure: ARTHROSCOPY, KNEE WITH MEDIAL AND LATERAL MENISCUS DEBRIDEMENT;  Surgeon: Jamie Tucker MD;  Location: [facility] OR;  Service: Orthopedics;  Laterality: Right;  video confirmed CW 8/26    CERVICAL SPINE SURGERY      HYSTERECTOMY      KNEE ARTHROSCOPY W/ MENISCECTOMY  8/29/2022    Procedure: ARTHROSCOPY, KNEE, WITH MENISCECTOMY;  Surgeon: Jamie Tucker MD;  Location: [facility] OR;  Service: Orthopedics;;        Current Outpatient Medications   Medication Instructions    calcium cit-mag-D3-Zn--babita 747--3.75 mg-mg-unit-mg Tab 1 tablet, Oral    COMBIGAN 0.2-0.5 % Drop 1 drop, Both Eyes, 2 times daily    DUREZOL 0.05 % Drop ophthalmic solution 1 DROP IN LEFT EYE IN THE MORNING    estradioL (ESTRACE) 0.5 MG  tablet 1 tablet, Oral, Daily    estrogens (conjugated) (PREMARIN) 1.25 mg, Daily    hydroCHLOROthiazide (HYDRODIURIL) 12.5 mg, Oral    ibuprofen (ADVIL,MOTRIN) 800 mg, Oral, Every 8 hours PRN    losartan (COZAAR) 25 mg, Oral    multivitamin capsule 1 capsule, Oral, Daily    ofloxacin (OCUFLOX) 0.3 % ophthalmic solution 1 drop, Ophthalmic, 4 times daily    oxycodone-acetaminophen 5-325 mg (PERCOCET) 5-325 mg per tablet 1 tablet, Oral, Every 4 hours PRN    potassium gluconate 2.5 mEq Tab 1 tablet, Oral    sodium fluoride-pot nitrate (PREVIDENT 5000 SENSITIVE) 1.1-5 % Pste PLEASE SEE ATTACHED FOR DETAILED DIRECTIONS    timolol maleate 0.5% (TIMOPTIC) 0.5 % Drop No dose, route, or frequency recorded.    TRAVATAN Z 0.004 % Drop 1 drop, Both Eyes, Nightly    vitamin D (VITAMIN D3) 5,000 Units, Oral    XIIDRA 5 % Dpet 1 drop  twice a day    zolpidem (AMBIEN) 5 mg, Oral, Nightly PRN        Review of patient's allergies indicates:   Allergen Reactions    Penicillins      Pt cant remember allergy type         Social History     Socioeconomic History    Marital status: Single   Tobacco Use    Smoking status: Former     Years: 20.00     Types: Cigarettes     Quit date: 2006     Years since quittin.3    Smokeless tobacco: Never   Substance and Sexual Activity    Alcohol use: Yes     Comment: occasional    Drug use: No       No family history on file.      Review of Systems   Constitutional: Negative for chills and fever.   HENT:  Negative for hearing loss.    Eyes:  Negative for blurred vision.   Cardiovascular:  Negative for chest pain.   Respiratory:  Negative for shortness of breath.    Gastrointestinal:  Negative for abdominal pain.   Neurological:  Negative for light-headedness.        Objective:      General    Nursing note and vitals reviewed.  Constitutional: She is oriented to person, place, and time. She appears well-developed and well-nourished.   HENT:   Head: Normocephalic and atraumatic.   Eyes: Pupils are  equal, round, and reactive to light.   Cardiovascular:  Normal rate and regular rhythm.            Pulmonary/Chest: Effort normal.   Abdominal: Soft.   Neurological: She is oriented to person, place, and time.   Psychiatric: She has a normal mood and affect. Her behavior is normal.     General Musculoskeletal Exam   Gait: normal         Right Hip Exam     Inspection   No deformity of hip.  Quadriceps Atrophy:  Negative    Range of Motion   Extension:  0   Flexion:  100     Tests   Pain w/ forced internal rotation (KEYSHA): absent  Pain w/ forced external rotation (FADIR): absent    Other   Sensation: normal  Left Hip Exam     Inspection   No deformity of hip.  Quadriceps Atrophy:  negative    Range of Motion   Extension:  0   Flexion:  90     Tests   Pain w/ forced internal rotation (KEYSHA): absent  Pain w/ forced external rotation (FADIR): absent  Stincfield test: positive  Snapping Hip (internal): negative    Other   Sensation: normal          Muscle Strength   Right Lower Extremity   Hip Flexion: 5/5   Ankle Dorsiflexion:  5/5   Left Lower Extremity   Hip Flexion: 5/5   Ankle Dorsiflexion:  5/5     Vascular Exam     Right Pulses  Dorsalis Pedis:      2+          Left Pulses  Dorsalis Pedis:      2+          Edema  Right Upper Leg: absent  Left Upper Leg: absent    Imaging:  none    Procedures        Assessment:       Diane Reid is a 77 y.o. female seen in the office today. The encounter diagnosis was Primary osteoarthritis of left hip.  Non-operative treatment is recommended at this time. The patient wants to stop therapy and continue NSAID as needed and will see how the pain does.  If it worsens then will consider surgical referral. The natural history and expected course discussed with patient. Various treatment options were discussed, including their risks and benefits. All of the patient's questions were answered.     Plan:      Tylenol 650mg TID, PRN pain.  Follow up as needed if symptoms worsen.          Kevin Potter IV, MD   of Clinical Orthopedics  Department of Orthopedic Surgery  [facility]  Office: [phone]  Website: [email or web address]    ---------------------------------------

## (undated) DEVICE — MANIFOLD 4 PORT

## (undated) DEVICE — TUBING SUCTION SCALLOP 3/16X10

## (undated) DEVICE — MAT SUCTION PUDDLEVAC ORANGE

## (undated) DEVICE — TUBING SUC UNIV W/CONN 12FT

## (undated) DEVICE — BNDG COFLEX FOAM LF2 ST 6X5YD

## (undated) DEVICE — GAUZE SPONGE 4X4 12PLY

## (undated) DEVICE — CLOSURE SKIN STERI STRIP 1/4X4

## (undated) DEVICE — COVER OVERHEAD SURG LT BLUE

## (undated) DEVICE — NDL SPINAL 18GX3.5 SPINOCAN

## (undated) DEVICE — SYR 30CC LUER LOCK

## (undated) DEVICE — DRAPE U SPLIT SHEET 54X76IN

## (undated) DEVICE — DURAPREP SURG SCRUB 26ML

## (undated) DEVICE — SEE MEDLINE ITEM 157216

## (undated) DEVICE — ALCOHOL 70% ISOP W/GREEN 16OZ

## (undated) DEVICE — NDL 18GA X1 1/2 REG BEVEL

## (undated) DEVICE — ADHESIVE DERMABOND ADVANCED

## (undated) DEVICE — CLOSURE SKIN STERI STRIP 1/2X4

## (undated) DEVICE — CONTAINER SPECIMEN OR STER 4OZ

## (undated) DEVICE — GLOVE BIOGEL PI MICRO INDIC 8

## (undated) DEVICE — TUBING 4-LEAD ARTHOSCOPY

## (undated) DEVICE — BANDAGE MATRIX HK LOOP 6IN 5YD

## (undated) DEVICE — BLADE SURG CARBON STEEL SZ11

## (undated) DEVICE — Device

## (undated) DEVICE — DRESSING XEROFORM FOIL PK 1X8

## (undated) DEVICE — DRESSING GAUZE 6PLY 4X4

## (undated) DEVICE — SUT ETHILON 2-0 FS 18IN BLK

## (undated) DEVICE — COVER PROXIMA MAYO STAND

## (undated) DEVICE — BANDAGE ADHESIVE

## (undated) DEVICE — PAD ABDOMINAL 5X9 STERILE

## (undated) DEVICE — GLOVE BIOGEL ORTHOPEDIC 7.5

## (undated) DEVICE — TOWEL OR DISP STRL BLUE 4/PK